# Patient Record
Sex: FEMALE | Race: OTHER | HISPANIC OR LATINO | Employment: FULL TIME | URBAN - METROPOLITAN AREA
[De-identification: names, ages, dates, MRNs, and addresses within clinical notes are randomized per-mention and may not be internally consistent; named-entity substitution may affect disease eponyms.]

---

## 2021-11-19 ENCOUNTER — IMMUNIZATION (OUTPATIENT)
Dept: INTERNAL MEDICINE | Facility: CLINIC | Age: 26
End: 2021-11-19

## 2021-11-19 PROCEDURE — 90471 IMMUNIZATION ADMIN: CPT | Mod: PBBFAC

## 2021-12-23 ENCOUNTER — LAB VISIT (OUTPATIENT)
Dept: PRIMARY CARE CLINIC | Facility: OTHER | Age: 26
End: 2021-12-23

## 2021-12-23 DIAGNOSIS — J02.9 SORE THROAT: Primary | ICD-10-CM

## 2021-12-23 LAB
CTP QC/QA: YES
SARS-COV-2 AG RESP QL IA.RAPID: NEGATIVE

## 2022-02-21 DIAGNOSIS — J02.9 SORE THROAT: Primary | ICD-10-CM

## 2022-02-21 LAB
CTP QC/QA: YES
SARS-COV-2 AG RESP QL IA.RAPID: NEGATIVE

## 2022-02-21 PROCEDURE — 87811 SARS CORONAVIRUS 2 ANTIGEN POCT, MANUAL READ: ICD-10-PCS | Mod: S$GLB,,, | Performed by: PREVENTIVE MEDICINE

## 2022-02-21 PROCEDURE — 87811 SARS-COV-2 COVID19 W/OPTIC: CPT | Mod: S$GLB,,, | Performed by: PREVENTIVE MEDICINE

## 2022-03-31 ENCOUNTER — OFFICE VISIT (OUTPATIENT)
Dept: INTERNAL MEDICINE | Facility: CLINIC | Age: 27
End: 2022-03-31

## 2022-03-31 VITALS
BODY MASS INDEX: 46.09 KG/M2 | DIASTOLIC BLOOD PRESSURE: 80 MMHG | WEIGHT: 250.44 LBS | OXYGEN SATURATION: 97 % | SYSTOLIC BLOOD PRESSURE: 132 MMHG | HEIGHT: 62 IN | HEART RATE: 83 BPM

## 2022-03-31 DIAGNOSIS — M25.572 ACUTE LEFT ANKLE PAIN: Primary | ICD-10-CM

## 2022-03-31 PROCEDURE — 99999 PR PBB SHADOW E&M-EST. PATIENT-LVL III: CPT | Mod: PBBFAC,,, | Performed by: INTERNAL MEDICINE

## 2022-03-31 PROCEDURE — 99202 PR OFFICE/OUTPT VISIT, NEW, LEVL II, 15-29 MIN: ICD-10-PCS | Mod: S$PBB,,, | Performed by: INTERNAL MEDICINE

## 2022-03-31 PROCEDURE — 99202 OFFICE O/P NEW SF 15 MIN: CPT | Mod: S$PBB,,, | Performed by: INTERNAL MEDICINE

## 2022-03-31 PROCEDURE — 99213 OFFICE O/P EST LOW 20 MIN: CPT | Mod: PBBFAC | Performed by: INTERNAL MEDICINE

## 2022-03-31 PROCEDURE — 99999 PR PBB SHADOW E&M-EST. PATIENT-LVL III: ICD-10-PCS | Mod: PBBFAC,,, | Performed by: INTERNAL MEDICINE

## 2022-03-31 RX ORDER — ESCITALOPRAM OXALATE 10 MG/1
TABLET ORAL
COMMUNITY
End: 2024-03-01

## 2022-03-31 NOTE — PROGRESS NOTES
"  Subjective:       Patient ID: Marlin Jamison is a 26 y.o. female.    Chief Complaint: Leg Injury      HPI  Marlin Jamison is a 26 y.o. year old female ankle inversion injury while wearing 1 inch heels. No LOC, no head injury, mechanical slip and fall. Has been recurrently injuring L ankle over the years. Always L inversion injury.     Review of Systems   Constitutional:        No head injury  Mechanical slip and fall  No LOC   Musculoskeletal: Positive for arthralgias.         History reviewed. No pertinent past medical history.     Prior to Admission medications    Medication Sig Start Date End Date Taking? Authorizing Provider   EScitalopram oxalate (LEXAPRO) 10 MG tablet    Yes Historical Provider        Past medical history, surgical history, and family medical history reviewed and updated as appropriate.    Medications and allergies reviewed.     Objective:          Vitals:    03/31/22 1444   BP: 132/80   BP Location: Right arm   Patient Position: Sitting   BP Method: Medium (Manual)   Pulse: 83   SpO2: 97%   Weight: 113.6 kg (250 lb 7.1 oz)   Height: 5' 2" (1.575 m)     Body mass index is 45.81 kg/m².  Physical Exam  Cardiovascular:      Pulses:           Dorsalis pedis pulses are 2+ on the right side and 2+ on the left side.   Musculoskeletal:      Right foot: Normal range of motion. No deformity, foot drop or prominent metatarsal heads.      Left foot: Decreased range of motion. No deformity, foot drop or prominent metatarsal heads.        Feet:    Feet:      Right foot:      Skin integrity: Skin integrity normal. No erythema or warmth.      Toenail Condition: Right toenails are normal.      Left foot:      Skin integrity: Skin integrity normal. No erythema or warmth.      Toenail Condition: Left toenails are normal.         No results found for: WBC, HGB, HCT, PLT, CHOL, TRIG, HDL, LDLDIRECT, ALT, AST, NA, K, CL, CREATININE, BUN, CO2, TSH, PSA, INR, GLUF, HGBA1C, MICROALBUR    Assessment:       1. Acute " left ankle pain          Plan:     Marlin was seen today for leg injury.    Diagnoses and all orders for this visit:    Acute left ankle pain  Comments:  recurrent sprain of L ankle, inversion injury, mild bruising inferior to lateral malleolus, difficulty with eversion. Taking ibuprofen with relief. ice helps  Orders:  -     X-Ray Ankle Complete 3 View Left; Future    ace wrap applied to ankle in clinic; discussed RICE therapy    Obtain x-ray today, if abnormal, orthopedics. If normal, will allow time for recovery, if having further issues - pt and possibly ortho/sports medicine referral.  Follow up if symptoms worsen or fail to improve.    Prateek Bender MD  Internal Medicine / Primary Care  [facility]  3/31/2022

## 2022-06-23 DIAGNOSIS — R43.0 LOSS OF SMELL: Primary | ICD-10-CM

## 2022-06-23 LAB
CTP QC/QA: YES
SARS-COV-2 AG RESP QL IA.RAPID: POSITIVE

## 2022-06-28 ENCOUNTER — TELEPHONE (OUTPATIENT)
Dept: INTERNAL MEDICINE | Facility: CLINIC | Age: 27
End: 2022-06-28

## 2022-07-17 ENCOUNTER — HOSPITAL ENCOUNTER (EMERGENCY)
Facility: OTHER | Age: 27
Discharge: HOME | End: 2022-07-17
Attending: EMERGENCY MEDICINE

## 2022-07-17 VITALS
RESPIRATION RATE: 16 BRPM | HEIGHT: 62 IN | HEART RATE: 82 BPM | BODY MASS INDEX: 44.16 KG/M2 | SYSTOLIC BLOOD PRESSURE: 136 MMHG | OXYGEN SATURATION: 98 % | DIASTOLIC BLOOD PRESSURE: 72 MMHG | WEIGHT: 240 LBS | TEMPERATURE: 98 F

## 2022-07-17 DIAGNOSIS — S41.119A LACERATION OF ARM: Primary | ICD-10-CM
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
## 2022-07-17 DIAGNOSIS — S00.83XA CONTUSION OF FACE, INITIAL ENCOUNTER: ICD-10-CM

## 2022-07-17 PROCEDURE — 90715 TDAP VACCINE 7 YRS/> IM: CPT | Performed by: NURSE PRACTITIONER

## 2022-07-17 PROCEDURE — 90471 IMMUNIZATION ADMIN: CPT | Performed by: NURSE PRACTITIONER

## 2022-07-17 PROCEDURE — 12001 RPR S/N/AX/GEN/TRNK 2.5CM/<: CPT

## 2022-07-17 PROCEDURE — 99284 EMERGENCY DEPT VISIT MOD MDM: CPT | Mod: 25

## 2022-07-17 PROCEDURE — 63600175 PHARM REV CODE 636 W HCPCS: Performed by: NURSE PRACTITIONER

## 2022-07-17 RX ADMIN — CLOSTRIDIUM TETANI TOXOID ANTIGEN (FORMALDEHYDE INACTIVATED), CORYNEBACTERIUM DIPHTHERIAE TOXOID ANTIGEN (FORMALDEHYDE INACTIVATED), BORDETELLA PERTUSSIS TOXOID ANTIGEN (GLUTARALDEHYDE INACTIVATED), BORDETELLA PERTUSSIS FILAMENTOUS HEMAGGLUTININ ANTIGEN (FORMALDEHYDE INACTIVATED), BORDETELLA PERTUSSIS PERTACTIN ANTIGEN, AND BORDETELLA PERTUSSIS FIMBRIAE 2/3 ANTIGEN 0.5 ML: 5; 2; 2.5; 5; 3; 5 INJECTION, SUSPENSION INTRAMUSCULAR at 07:07
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
## 2022-07-28 ENCOUNTER — CLINICAL SUPPORT (OUTPATIENT)
Dept: URGENT CARE | Facility: CLINIC | Age: 27
End: 2022-07-28

## 2022-07-28 VITALS
SYSTOLIC BLOOD PRESSURE: 140 MMHG | OXYGEN SATURATION: 98 % | DIASTOLIC BLOOD PRESSURE: 101 MMHG | BODY MASS INDEX: 44.16 KG/M2 | HEART RATE: 77 BPM | WEIGHT: 240 LBS | TEMPERATURE: 99 F | HEIGHT: 62 IN | RESPIRATION RATE: 18 BRPM

## 2022-07-28 DIAGNOSIS — Z48.02 ENCOUNTER FOR REMOVAL OF SUTURES: Primary | ICD-10-CM

## 2022-07-28 PROCEDURE — 99499 NO LOS: ICD-10-PCS | Mod: S$GLB,,,

## 2022-07-28 PROCEDURE — 99499 UNLISTED E&M SERVICE: CPT | Mod: S$GLB,,,

## 2022-07-28 PROCEDURE — 99024 POSTOP FOLLOW-UP VISIT: CPT | Mod: S$GLB,,,

## 2022-07-28 PROCEDURE — 99024 SUTURE REMOVAL: ICD-10-PCS | Mod: S$GLB,,,

## 2022-07-28 NOTE — PROCEDURES
Suture Removal    Date/Time: 7/28/2022 5:15 PM  Location procedure was performed: [facility]  Performed by: Unique Leavitt PA-C  Authorized by: Unique Leavitt PA-C   Body area: upper extremity  Location details: right upper arm  Wound Appearance: clean, well healed, no drainage and nontender  Sutures Removed: 2  Post-removal: no dressing applied  Facility: sutures placed in this facility  Complications: No

## 2022-07-28 NOTE — PROGRESS NOTES
"Subjective:       Patient ID: Marlin Jamison is a 26 y.o. female.    Vitals:  height is 5' 2" (1.575 m) and weight is 108.9 kg (240 lb). Her oral temperature is 98.7 °F (37.1 °C). Her blood pressure is 140/101 (abnormal) and her pulse is 77. Her respiration is 18 and oxygen saturation is 98%.     Chief Complaint: Suture / Staple Removal    PT IS HERE TODAY TO HAVE SUTURES REMOVED FROM RIGHT ELBOW.  [facility]    Suture / Staple Removal  The sutures were placed 11 to 14 days ago. She tried nothing since the wound repair. The temperature was taken using an oral thermometer. There has been no drainage from the wound. There is no redness present. There is no swelling present. There is no pain present.     ROS    Objective:      Physical Exam   Constitutional: She is oriented to person, place, and time. She appears well-developed. She is cooperative.  Non-toxic appearance. She does not appear ill. No distress.   HENT:   Head: Normocephalic and atraumatic.   Ears:   Right Ear: Hearing and external ear normal.   Left Ear: Hearing and external ear normal.   Eyes: Lids are normal. Right eye exhibits no discharge. Left eye exhibits no discharge. No scleral icterus.   Neck: Trachea normal and phonation normal. Neck supple.   Cardiovascular: Normal rate and normal pulses.   Pulmonary/Chest: Effort normal. No respiratory distress.   Abdominal: Normal appearance.   Musculoskeletal: Normal range of motion.         General: No deformity. Normal range of motion.   Neurological: She is alert and oriented to person, place, and time. She exhibits normal muscle tone. Coordination normal.   Skin: Skin is warm, dry, intact, not diaphoretic and not pale.         Comments: Inverted y shaped laceration to posterior right upper arm just superior to olecranon process  No wound dehiscence  No signs of infection   Psychiatric: Her speech is normal and behavior is normal. Judgment and thought content normal. "   Nursing note and vitals reviewed.        Assessment:       1. Encounter for removal of sutures          Plan:         Wound C/D/I. No signs of infection. 2 sutures removed without difficulty.     Encounter for removal of sutures

## 2023-01-03 ENCOUNTER — PATIENT MESSAGE (OUTPATIENT)
Dept: RESEARCH | Facility: HOSPITAL | Age: 28
End: 2023-01-03

## 2023-04-13 ENCOUNTER — PATIENT MESSAGE (OUTPATIENT)
Dept: INTERNAL MEDICINE | Facility: CLINIC | Age: 28
End: 2023-04-13

## 2023-04-19 ENCOUNTER — PATIENT MESSAGE (OUTPATIENT)
Dept: RESEARCH | Facility: HOSPITAL | Age: 28
End: 2023-04-19

## 2023-05-12 ENCOUNTER — PATIENT MESSAGE (OUTPATIENT)
Dept: RESEARCH | Facility: HOSPITAL | Age: 28
End: 2023-05-12

## 2024-02-27 ENCOUNTER — NURSE TRIAGE (OUTPATIENT)
Dept: ADMINISTRATIVE | Facility: CLINIC | Age: 29
End: 2024-02-27
Payer: COMMERCIAL

## 2024-02-27 NOTE — TELEPHONE ENCOUNTER
Spoke with patient who states she has been having a irregular periods that comes with debilitating cramps intermittently.  Patient states the pain then radiates to her right side.  She currently has an IUD in place that has not been checked in 2 years.   Patient states she vomits daily related to anxiety.  Patient denies having anxiety at this time.  States she felt this way earlier today.  Patient vomited x 1 today (reoccurring 3-4 months).   Patient states she sometimes has severe abdominal pain but not at this time.  Patient states she is not able to tell if she has much more swelling in her abdomen. Patient does report having moderate vaginal bleeding.   Advised patient to be seen in office now.  Patient does not have established care with a OB-Gyn.  New patient appointment is on 3/12 patient stated she would wait until then.  Further advised that patient be seen today at [facility] or ED.  Patient verbalized understanding.     Reason for Disposition   MODERATE vaginal bleeding (e.g., soaking 1 pad or tampon per hour and present > 6 hours; 1 menstrual cup every 6 hours)   Vomiting and abdomen looks much more swollen than usual     Patient is not able to tell if her abdomen is more swollen than usual.   Vomiting and abdomen looks much more swollen than usual     Patient is not able to tell if her abdomen is more swollen than usual.    Additional Information   Negative: Shock suspected (e.g., cold/pale/clammy skin, too weak to stand, low BP, rapid pulse)   Negative: Sounds like a life-threatening emergency to the triager   Negative: SEVERE abdominal pain (e.g., excruciating) and present > 1 hour   Negative: SEVERE vaginal bleeding (e.g., soaking 2 pads or tampons per hour and present 2 or more hours; 1 menstrual cup every 2 hours)   Negative: SEVERE dizziness (e.g., unable to stand, requires support to walk, feels like passing out now)   Negative: Patient sounds very sick or weak to the triager   Negative: Passed out  (i.e., fainted, collapsed and was not responding)   Negative: Shock suspected (e.g., cold/pale/clammy skin, too weak to stand, low BP, rapid pulse)   Negative: Sounds like a life-threatening emergency to the triager   Negative: SEVERE abdominal pain (e.g., excruciating)   Negative: Vomiting red blood or black (coffee ground) material   Negative: Blood in bowel movements  (Exception: Blood on surface of BM with constipation.)   Negative: Black or tarry bowel movements  (Exception: Chronic-unchanged black-grey BMs AND is taking iron pills or Pepto-Bismol.)   Negative: MILD TO MODERATE constant pain lasting > 2 hours   Negative: Vomiting bile (green color)   Negative: Patient sounds very sick or weak to the triager   Negative: Shock suspected (e.g., cold/pale/clammy skin, too weak to stand, low BP, rapid pulse)   Negative: Difficult to awaken or acting confused (e.g., disoriented, slurred speech)   Negative: Sounds like a life-threatening emergency to the triager   Negative: Vomiting red blood or black (coffee ground) material   Negative: SEVERE vomiting (e.g., 6 or more times/day)  (Exception: Patient sounds well, is drinking liquids, does not sound dehydrated, and vomiting has lasted less than 24 hours.)   Negative: Recent head injury (within 3 days)   Negative: Recent abdominal injury (within 7 days)   Negative: Vomiting and hernia is more painful or swollen than usual   Negative: Insulin-dependent diabetes and glucose > 240 mg/dL (13 mmol/L)   Negative: Severe pain in one eye   Negative: MODERATE vomiting (e.g., 3 - 5 times/day) and age > 60 years   Negative: Constant abdominal pain lasting > 2 hours   Negative: High-risk adult (e.g., brain tumor, V-P shunt, hernia)   Negative: Drinking very little and has signs of dehydration (e.g., no urine > 12 hours, very dry mouth, very lightheaded)   Negative: Patient sounds very sick or weak to the triager    Protocols used: Contraception - IUD Symptoms and [id number],  Abdominal Pain - Female-A-OH, Vomiting-A-OH

## 2024-02-28 ENCOUNTER — TELEPHONE (OUTPATIENT)
Dept: OBSTETRICS AND GYNECOLOGY | Facility: CLINIC | Age: 29
End: 2024-02-28
Payer: COMMERCIAL

## 2024-03-01 ENCOUNTER — OFFICE VISIT (OUTPATIENT)
Dept: INTERNAL MEDICINE | Facility: CLINIC | Age: 29
End: 2024-03-01
Payer: MEDICAID

## 2024-03-01 VITALS
SYSTOLIC BLOOD PRESSURE: 130 MMHG | HEIGHT: 62 IN | WEIGHT: 245.38 LBS | OXYGEN SATURATION: 96 % | DIASTOLIC BLOOD PRESSURE: 86 MMHG | BODY MASS INDEX: 45.15 KG/M2 | HEART RATE: 93 BPM

## 2024-03-01 DIAGNOSIS — G89.29 CHRONIC PAIN OF LEFT KNEE: ICD-10-CM

## 2024-03-01 DIAGNOSIS — M25.562 CHRONIC PAIN OF LEFT KNEE: ICD-10-CM

## 2024-03-01 DIAGNOSIS — Z00.00 ANNUAL PHYSICAL EXAM: Primary | ICD-10-CM

## 2024-03-01 DIAGNOSIS — L30.9 ECZEMA, UNSPECIFIED TYPE: ICD-10-CM

## 2024-03-01 PROCEDURE — 99999 PR PBB SHADOW E&M-EST. PATIENT-LVL IV: CPT | Mod: PBBFAC,,, | Performed by: PHYSICIAN ASSISTANT

## 2024-03-01 PROCEDURE — 99395 PREV VISIT EST AGE 18-39: CPT | Mod: S$PBB,,, | Performed by: PHYSICIAN ASSISTANT

## 2024-03-01 PROCEDURE — 99214 OFFICE O/P EST MOD 30 MIN: CPT | Mod: PBBFAC | Performed by: PHYSICIAN ASSISTANT

## 2024-03-01 RX ORDER — FLUOXETINE 10 MG/1
10 CAPSULE ORAL
COMMUNITY
Start: 2023-12-23 | End: 2024-03-12

## 2024-03-01 RX ORDER — LAMOTRIGINE 200 MG/1
TABLET, ORALLY DISINTEGRATING ORAL
COMMUNITY
Start: 2023-12-01

## 2024-03-01 RX ORDER — CLONAZEPAM 0.5 MG/1
TABLET, ORALLY DISINTEGRATING ORAL
COMMUNITY
Start: 2023-11-06

## 2024-03-01 RX ORDER — TRIAMCINOLONE ACETONIDE 1 MG/G
CREAM TOPICAL 2 TIMES DAILY
Qty: 80 G | Refills: 0 | Status: SHIPPED | OUTPATIENT
Start: 2024-03-01 | End: 2024-06-11 | Stop reason: SDUPTHER

## 2024-03-01 RX ORDER — TRIAMCINOLONE ACETONIDE 1 MG/G
CREAM TOPICAL
COMMUNITY
End: 2024-03-01 | Stop reason: SDUPTHER

## 2024-03-01 RX ORDER — METHYLPHENIDATE HYDROCHLORIDE 20 MG/1
20 CAPSULE, EXTENDED RELEASE ORAL
COMMUNITY
Start: 2024-02-16

## 2024-03-01 NOTE — PROGRESS NOTES
Subjective     Patient ID: Marlin Jamison is a 28 y.o. female.    Chief Complaint: Annual Exam    HPI  Established pt of No, Primary Doctor (new to me)    Here for annual exam.     Noted some pelvic concerns has IUD placed that's overdue for removal, having cramps irregular menstrual cycles, heavy bleeding. Has f/u with GYN schedule for 3/12    Follows with psychiatry at [facility] for anxiety/ptsd/bipolar, mood improving in current regimen. Seeing a therapist    Request refill of steroid cream for eczema, uses prn    Past Medical History:   Diagnosis Date    Anxiety     Bipolar disorder, unspecified     Depression     PTSD (post-traumatic stress disorder)      Social History     Tobacco Use    Smoking status: Every Day     Current packs/day: 0.50     Average packs/day: 0.5 packs/day for 10.0 years (5.0 ttl pk-yrs)     Types: Cigarettes    Smokeless tobacco: Never   Substance Use Topics    Alcohol use: Yes     Alcohol/week: 8.0 standard drinks of alcohol     Types: 4 Glasses of wine, 4 Shots of liquor per week     Comment: social    Drug use: Yes     Types: Marijuana     Comment: social     Review of patient's allergies indicates:  No Known Allergies      Current Outpatient Medications:     clonazePAM (KLONOPIN) 0.5 MG disintegrating tablet, , Disp: , Rfl:     FLUoxetine 10 MG capsule, Take 10 mg by mouth., Disp: , Rfl:     lamoTRIgine 200 mg TbDL, , Disp: , Rfl:     methylphenidate HCl (METADATE CD) 20 MG CR capsule, Take 20 mg by mouth., Disp: , Rfl:     triamcinolone acetonide 0.1% (KENALOG) 0.1 % cream, Apply topically 2 (two) times daily., Disp: 80 g, Rfl: 0    Family History   Problem Relation Age of Onset    Diabetes Mother     Depression Mother     Kidney disease Mother     Mental illness Mother     No Known Problems Father      Past Surgical History:   Procedure Laterality Date    APPENDECTOMY      TONSILLECTOMY  2002         Review of Systems   Constitutional:  Positive for  "unexpected weight change. Negative for activity change.   HENT:  Negative for hearing loss, rhinorrhea and trouble swallowing.    Eyes:  Negative for discharge and visual disturbance.   Respiratory:  Negative for chest tightness and wheezing.    Cardiovascular:  Negative for chest pain and palpitations.   Gastrointestinal:  Positive for constipation and diarrhea. Negative for blood in stool and vomiting.   Endocrine: Negative for polydipsia and polyuria.   Genitourinary:  Positive for menstrual problem. Negative for difficulty urinating, dysuria and hematuria.   Musculoskeletal:  Positive for arthralgias. Negative for joint swelling and neck pain.   Neurological:  Negative for weakness and headaches.   Psychiatric/Behavioral:  Negative for confusion and dysphoric mood.      Answers submitted by the patient for this visit:  Review of Systems Questionnaire (Submitted on 2/27/2024)  activity change: No  unexpected weight change: Yes (weight gain, SSRI med related, now stable)  neck pain: No  hearing loss: No  rhinorrhea: No  trouble swallowing: No  eye discharge: No  visual disturbance: No  chest tightness: No  wheezing: No  chest pain: No  palpitations: No  blood in stool: No  constipation: Yes (in the past, last BM normal)  vomiting: No  diarrhea: Yes (in the past, last BM normal)  polydipsia: No  polyuria: No  difficulty urinating: No  hematuria: No  menstrual problem: Yes  dysuria: No  joint swelling: No  arthralgias: Yes (L knee, chronic, after falls)  headaches: No  weakness: No  confusion: No  dysphoric mood: No     Objective  /86 (BP Location: Right arm, Patient Position: Sitting, BP Method: Large (Manual))   Pulse 93   Ht 5' 2" (1.575 m)   Wt 111.3 kg (245 lb 6 oz)   SpO2 96%   BMI 44.88 kg/m²       Physical Exam  Vitals reviewed.   Constitutional:       General: She is not in acute distress.     Appearance: She is well-developed.   HENT:      Head: Normocephalic and atraumatic.      Right Ear: " Tympanic membrane, ear canal and external ear normal.      Left Ear: Tympanic membrane, ear canal and external ear normal.   Eyes:      Pupils: Pupils are equal, round, and reactive to light.   Cardiovascular:      Rate and Rhythm: Normal rate and regular rhythm.      Heart sounds: No murmur heard.  Pulmonary:      Effort: Pulmonary effort is normal.      Breath sounds: Normal breath sounds. No wheezing or rales.   Abdominal:      General: Bowel sounds are normal.      Palpations: Abdomen is soft.      Tenderness: There is no abdominal tenderness.   Musculoskeletal:         General: Normal range of motion.      Left knee: No swelling or bony tenderness. Normal range of motion. No tenderness.      Right lower leg: No edema.      Left lower leg: No edema.   Skin:     General: Skin is warm and dry.      Findings: No rash.   Neurological:      Mental Status: She is alert.   Psychiatric:         Mood and Affect: Mood normal.            Assessment and Plan     1. Annual physical exam  -     CBC Auto Differential; Future; Expected date: 03/01/2024  -     Comprehensive Metabolic Panel; Future; Expected date: 03/01/2024  -     TSH; Future; Expected date: 03/01/2024  -     Lipid Panel; Future; Expected date: 03/01/2024  -     Hemoglobin A1C; Future; Expected date: 03/01/2024  -     Hepatitis C Antibody; Future; Expected date: 03/01/2024  -     HIV 1/2 Ag/Ab (4th Gen); Future; Expected date: 03/01/2024    2. Eczema, unspecified type  -     triamcinolone acetonide 0.1% (KENALOG) 0.1 % cream; Apply topically 2 (two) times daily.  Dispense: 80 g; Refill: 0    3. Chronic pain of left knee  -     X-Ray Knee 3 View Left; Future; Expected date: 03/01/2024      Discussed need to choose MD as PCP to fully establish care with our practice site (list provided)      Future Appointments   Date Time Provider Department [place]   3/4/2024 11:00 AM [facility]   3/12/2024  1:15 PM Patricia Mancini MD [facility] [place]    3/28/2024  2:00 PM Nilda Doyle PA-C [facility]       Stephanie Ortiz PA-C

## 2024-03-04 ENCOUNTER — LAB VISIT (OUTPATIENT)
Dept: LAB | Facility: HOSPITAL | Age: 29
End: 2024-03-04
Attending: PHYSICIAN ASSISTANT
Payer: MEDICAID

## 2024-03-04 DIAGNOSIS — Z00.00 ANNUAL PHYSICAL EXAM: ICD-10-CM

## 2024-03-04 LAB
ALBUMIN SERPL BCP-MCNC: 3.7 G/DL (ref 3.5–5.2)
ALP SERPL-CCNC: 45 U/L (ref 55–135)
ALT SERPL W/O P-5'-P-CCNC: 28 U/L (ref 10–44)
ANION GAP SERPL CALC-SCNC: 12 MMOL/L (ref 8–16)
AST SERPL-CCNC: 27 U/L (ref 10–40)
BASOPHILS # BLD AUTO: 0.07 K/UL (ref 0–0.2)
BASOPHILS NFR BLD: 1 % (ref 0–1.9)
BILIRUB SERPL-MCNC: 0.3 MG/DL (ref 0.1–1)
BUN SERPL-MCNC: 12 MG/DL (ref 6–20)
CALCIUM SERPL-MCNC: 9.2 MG/DL (ref 8.7–10.5)
CHLORIDE SERPL-SCNC: 105 MMOL/L (ref 95–110)
CHOLEST SERPL-MCNC: 162 MG/DL (ref 120–199)
CHOLEST/HDLC SERPL: 3.1 {RATIO} (ref 2–5)
CO2 SERPL-SCNC: 19 MMOL/L (ref 23–29)
CREAT SERPL-MCNC: 0.7 MG/DL (ref 0.5–1.4)
DIFFERENTIAL METHOD BLD: ABNORMAL
EOSINOPHIL # BLD AUTO: 0.1 K/UL (ref 0–0.5)
EOSINOPHIL NFR BLD: 1.7 % (ref 0–8)
ERYTHROCYTE [DISTWIDTH] IN BLOOD BY AUTOMATED COUNT: 13.8 % (ref 11.5–14.5)
EST. GFR  (NO RACE VARIABLE): >60 ML/MIN/1.73 M^2
ESTIMATED AVG GLUCOSE: 100 MG/DL (ref 68–131)
GLUCOSE SERPL-MCNC: 82 MG/DL (ref 70–110)
HBA1C MFR BLD: 5.1 % (ref 4–5.6)
HCT VFR BLD AUTO: 43.6 % (ref 37–48.5)
HCV AB SERPL QL IA: NORMAL
HDLC SERPL-MCNC: 52 MG/DL (ref 40–75)
HDLC SERPL: 32.1 % (ref 20–50)
HGB BLD-MCNC: 14.1 G/DL (ref 12–16)
HIV 1+2 AB+HIV1 P24 AG SERPL QL IA: NORMAL
IMM GRANULOCYTES # BLD AUTO: 0.04 K/UL (ref 0–0.04)
IMM GRANULOCYTES NFR BLD AUTO: 0.6 % (ref 0–0.5)
LDLC SERPL CALC-MCNC: 90.8 MG/DL (ref 63–159)
LYMPHOCYTES # BLD AUTO: 1.7 K/UL (ref 1–4.8)
LYMPHOCYTES NFR BLD: 22.8 % (ref 18–48)
MCH RBC QN AUTO: 30.5 PG (ref 27–31)
MCHC RBC AUTO-ENTMCNC: 32.3 G/DL (ref 32–36)
MCV RBC AUTO: 94 FL (ref 82–98)
MONOCYTES # BLD AUTO: 0.6 K/UL (ref 0.3–1)
MONOCYTES NFR BLD: 8.8 % (ref 4–15)
NEUTROPHILS # BLD AUTO: 4.7 K/UL (ref 1.8–7.7)
NEUTROPHILS NFR BLD: 65.1 % (ref 38–73)
NONHDLC SERPL-MCNC: 110 MG/DL
NRBC BLD-RTO: 0 /100 WBC
PLATELET # BLD AUTO: 302 K/UL (ref 150–450)
PMV BLD AUTO: 10.4 FL (ref 9.2–12.9)
POTASSIUM SERPL-SCNC: 4.1 MMOL/L (ref 3.5–5.1)
PROT SERPL-MCNC: 6.9 G/DL (ref 6–8.4)
RBC # BLD AUTO: 4.63 M/UL (ref 4–5.4)
SODIUM SERPL-SCNC: 136 MMOL/L (ref 136–145)
TRIGL SERPL-MCNC: 96 MG/DL (ref 30–150)
TSH SERPL DL<=0.005 MIU/L-ACNC: 1.76 UIU/ML (ref 0.4–4)
WBC # BLD AUTO: 7.25 K/UL (ref 3.9–12.7)

## 2024-03-04 PROCEDURE — 86803 HEPATITIS C AB TEST: CPT | Performed by: PHYSICIAN ASSISTANT

## 2024-03-04 PROCEDURE — 80053 COMPREHEN METABOLIC PANEL: CPT | Performed by: PHYSICIAN ASSISTANT

## 2024-03-04 PROCEDURE — 83036 HEMOGLOBIN GLYCOSYLATED A1C: CPT | Performed by: PHYSICIAN ASSISTANT

## 2024-03-04 PROCEDURE — 85025 COMPLETE CBC W/AUTO DIFF WBC: CPT | Performed by: PHYSICIAN ASSISTANT

## 2024-03-04 PROCEDURE — 87389 HIV-1 AG W/HIV-1&-2 AB AG IA: CPT | Performed by: PHYSICIAN ASSISTANT

## 2024-03-04 PROCEDURE — 36415 COLL VENOUS BLD VENIPUNCTURE: CPT | Mod: PO | Performed by: PHYSICIAN ASSISTANT

## 2024-03-04 PROCEDURE — 84443 ASSAY THYROID STIM HORMONE: CPT | Performed by: PHYSICIAN ASSISTANT

## 2024-03-04 PROCEDURE — 80061 LIPID PANEL: CPT | Performed by: PHYSICIAN ASSISTANT

## 2024-03-12 ENCOUNTER — LAB VISIT (OUTPATIENT)
Dept: LAB | Facility: HOSPITAL | Age: 29
End: 2024-03-12
Attending: STUDENT IN AN ORGANIZED HEALTH CARE EDUCATION/TRAINING PROGRAM
Payer: MEDICAID

## 2024-03-12 ENCOUNTER — OFFICE VISIT (OUTPATIENT)
Dept: OBSTETRICS AND GYNECOLOGY | Facility: CLINIC | Age: 29
End: 2024-03-12
Payer: MEDICAID

## 2024-03-12 VITALS
SYSTOLIC BLOOD PRESSURE: 126 MMHG | HEART RATE: 80 BPM | BODY MASS INDEX: 41.44 KG/M2 | WEIGHT: 242.75 LBS | HEIGHT: 64 IN | DIASTOLIC BLOOD PRESSURE: 90 MMHG

## 2024-03-12 DIAGNOSIS — Z12.4 ENCOUNTER FOR PAPANICOLAOU SMEAR FOR CERVICAL CANCER SCREENING: ICD-10-CM

## 2024-03-12 DIAGNOSIS — Z01.419 ENCOUNTER FOR GYNECOLOGICAL EXAMINATION: Primary | ICD-10-CM

## 2024-03-12 DIAGNOSIS — Z11.3 SCREENING EXAMINATION FOR STD (SEXUALLY TRANSMITTED DISEASE): ICD-10-CM

## 2024-03-12 DIAGNOSIS — R10.31 RLQ ABDOMINAL PAIN: ICD-10-CM

## 2024-03-12 DIAGNOSIS — Z11.51 ENCOUNTER FOR SCREENING FOR HUMAN PAPILLOMAVIRUS (HPV): ICD-10-CM

## 2024-03-12 LAB — HBV SURFACE AG SERPL QL IA: NORMAL

## 2024-03-12 PROCEDURE — 99385 PREV VISIT NEW AGE 18-39: CPT | Mod: S$PBB,,, | Performed by: STUDENT IN AN ORGANIZED HEALTH CARE EDUCATION/TRAINING PROGRAM

## 2024-03-12 PROCEDURE — 87340 HEPATITIS B SURFACE AG IA: CPT | Performed by: STUDENT IN AN ORGANIZED HEALTH CARE EDUCATION/TRAINING PROGRAM

## 2024-03-12 PROCEDURE — 36415 COLL VENOUS BLD VENIPUNCTURE: CPT | Performed by: STUDENT IN AN ORGANIZED HEALTH CARE EDUCATION/TRAINING PROGRAM

## 2024-03-12 PROCEDURE — 86592 SYPHILIS TEST NON-TREP QUAL: CPT | Performed by: STUDENT IN AN ORGANIZED HEALTH CARE EDUCATION/TRAINING PROGRAM

## 2024-03-12 PROCEDURE — 87591 N.GONORRHOEAE DNA AMP PROB: CPT | Performed by: STUDENT IN AN ORGANIZED HEALTH CARE EDUCATION/TRAINING PROGRAM

## 2024-03-12 PROCEDURE — 87624 HPV HI-RISK TYP POOLED RSLT: CPT | Performed by: STUDENT IN AN ORGANIZED HEALTH CARE EDUCATION/TRAINING PROGRAM

## 2024-03-12 PROCEDURE — 99213 OFFICE O/P EST LOW 20 MIN: CPT | Mod: PBBFAC | Performed by: STUDENT IN AN ORGANIZED HEALTH CARE EDUCATION/TRAINING PROGRAM

## 2024-03-12 PROCEDURE — 87491 CHLMYD TRACH DNA AMP PROBE: CPT | Performed by: STUDENT IN AN ORGANIZED HEALTH CARE EDUCATION/TRAINING PROGRAM

## 2024-03-12 PROCEDURE — 88175 CYTOPATH C/V AUTO FLUID REDO: CPT | Performed by: STUDENT IN AN ORGANIZED HEALTH CARE EDUCATION/TRAINING PROGRAM

## 2024-03-12 PROCEDURE — 99999 PR PBB SHADOW E&M-EST. PATIENT-LVL III: CPT | Mod: PBBFAC,,, | Performed by: STUDENT IN AN ORGANIZED HEALTH CARE EDUCATION/TRAINING PROGRAM

## 2024-03-12 RX ORDER — FLUOXETINE HYDROCHLORIDE 20 MG/1
CAPSULE ORAL
COMMUNITY
Start: 2024-01-08

## 2024-03-13 LAB
C TRACH DNA SPEC QL NAA+PROBE: NOT DETECTED
N GONORRHOEA DNA SPEC QL NAA+PROBE: NOT DETECTED
RPR SER QL: NORMAL

## 2024-03-19 ENCOUNTER — TELEPHONE (OUTPATIENT)
Dept: OBSTETRICS AND GYNECOLOGY | Facility: CLINIC | Age: 29
End: 2024-03-19

## 2024-03-19 DIAGNOSIS — N94.6 DYSMENORRHEA: Primary | ICD-10-CM

## 2024-03-19 LAB
HPV HR 12 DNA SPEC QL NAA+PROBE: POSITIVE
HPV16 AG SPEC QL: NEGATIVE
HPV18 DNA SPEC QL NAA+PROBE: NEGATIVE

## 2024-03-19 RX ORDER — NAPROXEN 500 MG/1
500 TABLET ORAL 2 TIMES DAILY WITH MEALS
Qty: 30 TABLET | Refills: 1 | Status: SHIPPED | OUTPATIENT
Start: 2024-03-19

## 2024-03-19 NOTE — TELEPHONE ENCOUNTER
----- Message from Natalia Mauro MA sent at 3/19/2024 10:36 AM CDT -----    ----- Message -----  From: Fawn Carey  Sent: 3/19/2024  10:28 AM CDT  To: [facility] Staff    Type:  Patient Returning Call    Who Called:pt   Who Left Message for Patient:  Does the patient know what this is regarding?:rx  Would the patient rather a call back or a response via [facility]? call  Best Call Back Number:[phone]  Additional Information: states she has abdominal pain and bleeding states nothing was prescribed after being discussed with provider. Would like to speak with care team. States she would also like to speak with office in regards to moving up April 24th cortney for US+IUD removal.

## 2024-03-21 LAB
FINAL PATHOLOGIC DIAGNOSIS: NORMAL
Lab: NORMAL

## 2024-03-22 ENCOUNTER — PATIENT MESSAGE (OUTPATIENT)
Dept: OBSTETRICS AND GYNECOLOGY | Facility: CLINIC | Age: 29
End: 2024-03-22
Payer: MEDICAID

## 2024-03-27 NOTE — PROGRESS NOTES
Chief Complaint: Well Woman Exam, RLQ pain     HPI:      Marlin Jamison is a 28 y.o.  who presents today for well woman exam. C/o RLQ pain that has been coming and going for years, worse and more intense the past 6 months. Described as sharp cramping. Pain is worsened with BM, during cycle and with tactile pressure. Mild relief with NSAIDs, heating pad. Denies fever, chills, nausea, vomiting, changes in BM. Reports her cycles are irregular with the Kyleena IUD, which she believes is due for removal soon and desires removal.   LMP: Patient's last menstrual period was 02/10/2024 (approximate).  No other GYN issues, problems, or complaints. Specifically, patient denies abnormal vaginal discharge, current pelvic pain, urinary problems. Ms. Jamison is currently sexually active with a single male partner. She is currently using Kyleena inserted in 2019 for contraception. She would like STD screening today.    Previous Pap: WNL per pt    Past Medical History:   Diagnosis Date    Anxiety     Bipolar disorder, unspecified     Depression     PTSD (post-traumatic stress disorder)     STD (sexually transmitted disease)      Past Surgical History:   Procedure Laterality Date    ABDOMINAL SURGERY  2005    APPENDECTOMY      TONSILLECTOMY  2002     Social History     Socioeconomic History    Marital status: Single   Tobacco Use    Smoking status: Every Day     Current packs/day: 0.50     Average packs/day: 0.5 packs/day for 10.0 years (5.0 ttl pk-yrs)     Types: Cigarettes    Smokeless tobacco: Never   Substance and Sexual Activity    Alcohol use: Yes     Alcohol/week: 8.0 standard drinks of alcohol     Types: 4 Glasses of wine, 4 Shots of liquor per week     Comment: social    Drug use: Not Currently     Types: Marijuana     Comment: social    Sexual activity: Yes     Partners: Female, Male     Birth control/protection: I.U.D.     Social Determinants of Health     Financial Resource Strain: Medium Risk  "(2024)    Overall Financial Resource Strain (CARDIA)     Difficulty of Paying Living Expenses: Somewhat hard   Food Insecurity: Food Insecurity Present (2024)    Hunger Vital Sign     Worried About Running Out of Food in the Last Year: Never true     Ran Out of Food in the Last Year: Sometimes true   Transportation Needs: Unmet Transportation Needs (2024)    PRAPARE - Transportation     Lack of Transportation (Medical): Yes     Lack of Transportation (Non-Medical): Yes   Physical Activity: Insufficiently Active (2024)    Exercise Vital Sign     Days of Exercise per Week: 1 day     Minutes of Exercise per Session: 60 min   Stress: Stress Concern Present (2024)    [place] [employer] - Occupational Stress Questionnaire     Feeling of Stress : Very much   Social Connections: Unknown (2024)    Social Connection and Isolation Panel [NHANES]     Frequency of Communication with Friends and Family: More than three times a week     Frequency of Social Gatherings with Friends and Family: Twice a week     Active Member of Clubs or Organizations: No     Attends Club or Organization Meetings: Never     Marital Status: Living with partner   Housing Stability: High Risk (2024)    Housing Stability Vital Sign     Unable to Pay for Housing in the Last Year: Yes     Number of Places Lived in the Last Year: 1     Unstable Housing in the Last Year: No     Family History   Problem Relation Age of Onset    Diabetes Mother     Depression Mother     Kidney disease Mother     Mental illness Mother     No Known Problems Father      Review of patient's allergies indicates:  No Known Allergies    OB History          0    Para   0    Term   0       0    AB   0    Living   0         SAB   0    IAB   0    Ectopic   0    Multiple   0    Live Births   0                 Physical Exam:      PHYSICAL EXAM:  BP (!) 126/90   Pulse 80   Ht 5' 3.5" (1.613 m)   Wt 110.1 kg (242 lb " 11.6 oz)   LMP 02/10/2024 (Approximate)   BMI 42.32 kg/m²   Body mass index is 42.32 kg/m².     APPEARANCE: Well nourished, well developed, in no acute distress.  PSYCH: Appropriate mood and affect.  SKIN: No acne or hirsutism  NECK: Neck symmetric without masses  NODES: No inguinal, axillary, or supraclavicular lymph node enlargement  ABDOMEN: Soft.  No tenderness or masses.    CARDIOVASCULAR: No edema of peripheral extremities  BREASTS: Symmetrical, no visible skin lesions. No palpable masses. No nipple discharge bilaterally.  PELVIC: Normal external genitalia without lesions.  Normal hair distribution.  Adequate perineal body, normal urethral meatus.  Vagina moist and well rugated. Without lesions. withoutdischarge.  Cervix pink, without lesions, discharge or tenderness, IUD strings not visualized and unable to be retrieved with endocervical brush x 2.  No significant cystocele or rectocele.  Bimanual exam shows uterus to be normal size, regular, mobile and nontender.  Adnexa without masses or tenderness.      Assessment/Plan:     Encounter for gynecological examination    Encounter for Papanicolaou smear for cervical cancer screening  -     Liquid-Based Pap Smear, Screening    Encounter for screening for human papillomavirus (HPV)  -     HPV High Risk Genotypes, PCR    Screening examination for STD (sexually transmitted disease)  -     RPR; Future; Expected date: 03/12/2024  -     HEPATITIS B SURFACE ANTIGEN; Future; Expected date: 03/12/2024  -     C. trachomatis/N. gonorrhoeae by AMP DNA    RLQ abdominal pain  -     US Pelvis Comp with Transvag NON-OB (xpd; Future    - pelvic exam as above  - no IUD strings visualized, therefore, unable to removed today -- will set up for US guided IUD removal and obtain full pelvic US at that time to assess adnexa due to c/o pelvic pain, she is agreeable to this and advised to take ibuprofen prior to procedure  - gc/cz collected and serum STI panel ordered  - rtc for pelvic  US/IUD removal    Counseling:     Patient was counseled today on current ASCCP pap guidelines, the recommendation for yearly physical exams, safe driving habits, breast self awareness. She is to see her PCP for other health maintenance.     Use of the [facility] Patient Portal discussed and encouraged during today's visit.

## 2024-04-11 ENCOUNTER — PROCEDURE VISIT (OUTPATIENT)
Dept: OBSTETRICS AND GYNECOLOGY | Facility: CLINIC | Age: 29
End: 2024-04-11
Attending: STUDENT IN AN ORGANIZED HEALTH CARE EDUCATION/TRAINING PROGRAM
Payer: MEDICAID

## 2024-04-11 VITALS
DIASTOLIC BLOOD PRESSURE: 82 MMHG | WEIGHT: 246.81 LBS | SYSTOLIC BLOOD PRESSURE: 122 MMHG | BODY MASS INDEX: 43.03 KG/M2

## 2024-04-11 DIAGNOSIS — Z97.5 ATTEMPTED IUD REMOVAL, UNSUCCESSFUL: Primary | ICD-10-CM

## 2024-04-11 DIAGNOSIS — Z53.8 ATTEMPTED IUD REMOVAL, UNSUCCESSFUL: Primary | ICD-10-CM

## 2024-04-11 DIAGNOSIS — T83.32XD INTRAUTERINE CONTRACEPTIVE DEVICE THREADS LOST, SUBSEQUENT ENCOUNTER: ICD-10-CM

## 2024-04-11 PROCEDURE — 99499 UNLISTED E&M SERVICE: CPT | Mod: S$PBB,,, | Performed by: STUDENT IN AN ORGANIZED HEALTH CARE EDUCATION/TRAINING PROGRAM

## 2024-04-11 RX ORDER — DOXYCYCLINE HYCLATE 100 MG
100 TABLET ORAL 2 TIMES DAILY
Qty: 14 TABLET | Refills: 0 | Status: SHIPPED | OUTPATIENT
Start: 2024-04-11

## 2024-04-11 NOTE — PROCEDURES
Procedure: IUD Removal   Marlin Jamison is a 28 y.o.  who presents today for US guided IUD removal. IUD strings were not visible at prior visit.  Patient's last menstrual period was 2024 (approximate)..     The risks, benefits, and alternatives to IUD removal were reviewed.  All of Ms. Jamison's questions were answered. She voiced her understanding and agreed to proceed with removal. Consent was signed.     Vitals:    24 1041   BP: 122/82   Weight: 112 kg (246 lb 12.9 oz)     GENERAL: Well nourished, well developed, in no acute distress.     Procedure:  Speculum was placed in the vagina and cervix visualized. IUD strings were not visualized. An IUD hook was gently advanced through the cervical os; however, IUD could not be removed. Curved Demetria clamp was also used by myself and Dr Miller without successful removal. Entire procedure done under US guidance.  All instruments removed from the vagina. Tenaculum sites hemostatic. The patient tolerated the procedure well.     Assessment/Plan:     Attempted IUD removal, unsuccessful  -     doxycycline (VIBRA-TABS) 100 MG tablet; Take 1 tablet (100 mg total) by mouth 2 (two) times daily.  Dispense: 14 tablet; Refill: 0  -     Case Request Operating Room: HYSTEROSCOPY    Intrauterine contraceptive device threads lost, subsequent encounter  -     Case Request Operating Room: HYSTEROSCOPY    - Rx doxycycline for ppx due to uterine instrumentation  - discussed options and pt amenable to outpatient hysteroscopic IUD removal, will schedule for 24 at [place], case request placed    Contraception: partner's vasectomy

## 2024-04-19 DIAGNOSIS — N76.0 ACUTE VAGINITIS: Primary | ICD-10-CM

## 2024-04-19 NOTE — TELEPHONE ENCOUNTER
----- Message from Patricia Mancini MD sent at 4/18/2024  2:52 PM CDT -----    Requested Date: 5/14   Requested Time: 0700   Case Length:50 minutes    Surgeon: Patricia Mancini      Assistant Surgeon: None   Visit Type: Outpatient    LOCATION: [facility]    PROCEDURE:hysteroscopic IUD removal  Diagnosis:IUD strings lost  Anesthesia type: General   Comments/Special Equipment Needed:  Rep needed: No  Does the patient need a PreOp appointment with MD: Yes  U/S needed at pre-op: No  PCP clearance: Not Needed  When does she need her Post op appointment: 2 weeks in person or virtual  Do you need additional time blocked out from clinic? No  If so, what time do you want to be back in clinic? N/a  When can the patient go back to work? 3 days

## 2024-04-24 RX ORDER — FLUCONAZOLE 200 MG/1
200 TABLET ORAL DAILY
Qty: 2 TABLET | Refills: 0 | Status: SHIPPED | OUTPATIENT
Start: 2024-04-24

## 2024-05-01 ENCOUNTER — TELEPHONE (OUTPATIENT)
Dept: INTERNAL MEDICINE | Facility: CLINIC | Age: 29
End: 2024-05-01
Payer: MEDICAID

## 2024-05-01 NOTE — TELEPHONE ENCOUNTER
----- Message from Nicole Scott sent at 4/30/2024  3:57 PM CDT -----  Type:  Appointment Request         Name of Caller:pt  When is the first available appointment?No access  Symptoms:c/u  Would the patient rather a call back or a response via [facility]? call  Best Call Back Number:[phone]   Additional Information: Pt would like to schedule a check up. Please call pt with further info and assistance. Thank you.

## 2024-05-10 ENCOUNTER — OFFICE VISIT (OUTPATIENT)
Dept: OBSTETRICS AND GYNECOLOGY | Facility: CLINIC | Age: 29
End: 2024-05-10
Attending: STUDENT IN AN ORGANIZED HEALTH CARE EDUCATION/TRAINING PROGRAM
Payer: MEDICAID

## 2024-05-10 VITALS
WEIGHT: 242.31 LBS | SYSTOLIC BLOOD PRESSURE: 118 MMHG | BODY MASS INDEX: 41.37 KG/M2 | DIASTOLIC BLOOD PRESSURE: 84 MMHG | HEIGHT: 64 IN

## 2024-05-10 DIAGNOSIS — T83.32XD INTRAUTERINE CONTRACEPTIVE DEVICE THREADS LOST, SUBSEQUENT ENCOUNTER: Primary | ICD-10-CM

## 2024-05-10 DIAGNOSIS — R10.9 RIGHT SIDED ABDOMINAL PAIN: ICD-10-CM

## 2024-05-10 PROBLEM — T83.32XA IUD THREADS LOST: Status: ACTIVE | Noted: 2024-05-10

## 2024-05-10 PROCEDURE — 99999 PR PBB SHADOW E&M-EST. PATIENT-LVL III: CPT | Mod: PBBFAC,,, | Performed by: STUDENT IN AN ORGANIZED HEALTH CARE EDUCATION/TRAINING PROGRAM

## 2024-05-10 PROCEDURE — 99214 OFFICE O/P EST MOD 30 MIN: CPT | Mod: S$PBB,,, | Performed by: STUDENT IN AN ORGANIZED HEALTH CARE EDUCATION/TRAINING PROGRAM

## 2024-05-10 PROCEDURE — 3074F SYST BP LT 130 MM HG: CPT | Mod: CPTII,,, | Performed by: STUDENT IN AN ORGANIZED HEALTH CARE EDUCATION/TRAINING PROGRAM

## 2024-05-10 PROCEDURE — 1160F RVW MEDS BY RX/DR IN RCRD: CPT | Mod: CPTII,,, | Performed by: STUDENT IN AN ORGANIZED HEALTH CARE EDUCATION/TRAINING PROGRAM

## 2024-05-10 PROCEDURE — 3079F DIAST BP 80-89 MM HG: CPT | Mod: CPTII,,, | Performed by: STUDENT IN AN ORGANIZED HEALTH CARE EDUCATION/TRAINING PROGRAM

## 2024-05-10 PROCEDURE — 99213 OFFICE O/P EST LOW 20 MIN: CPT | Mod: PBBFAC | Performed by: STUDENT IN AN ORGANIZED HEALTH CARE EDUCATION/TRAINING PROGRAM

## 2024-05-10 PROCEDURE — 1159F MED LIST DOCD IN RCRD: CPT | Mod: CPTII,,, | Performed by: STUDENT IN AN ORGANIZED HEALTH CARE EDUCATION/TRAINING PROGRAM

## 2024-05-10 PROCEDURE — 3044F HG A1C LEVEL LT 7.0%: CPT | Mod: CPTII,,, | Performed by: STUDENT IN AN ORGANIZED HEALTH CARE EDUCATION/TRAINING PROGRAM

## 2024-05-10 PROCEDURE — 3008F BODY MASS INDEX DOCD: CPT | Mod: CPTII,,, | Performed by: STUDENT IN AN ORGANIZED HEALTH CARE EDUCATION/TRAINING PROGRAM

## 2024-05-10 RX ORDER — FAMOTIDINE 20 MG/1
20 TABLET, FILM COATED ORAL
Status: CANCELLED | OUTPATIENT
Start: 2024-05-10

## 2024-05-10 RX ORDER — SODIUM CHLORIDE 9 MG/ML
INJECTION, SOLUTION INTRAVENOUS CONTINUOUS
Status: CANCELLED | OUTPATIENT
Start: 2024-05-10

## 2024-05-10 NOTE — H&P
Chief Complaint:  Pre-op Exam (HYSTEROSCOPY 24)      Patient Active Problem List   Diagnosis    IUD threads lost       History of Present Illness    Marlin Jamison is a 28 y.o.  here for preop visit.   IUD strings lost, unsuccessful attempt at removal under US guidance, pt amenable to hysteroscopic removal.    Past Medical History:   Diagnosis Date    Anxiety     Bipolar disorder, unspecified     Depression     PTSD (post-traumatic stress disorder)     STD (sexually transmitted disease)        Past Surgical History:   Procedure Laterality Date    ABDOMINAL SURGERY  2005    APPENDECTOMY      TONSILLECTOMY         Family History   Problem Relation Name Age of Onset    Diabetes Mother Shanel     Depression Mother Shanel     Kidney disease Mother Shanel     Mental illness Mother Shanel     No Known Problems Father         Social History     Socioeconomic History    Marital status: Single   Tobacco Use    Smoking status: Every Day     Current packs/day: 0.50     Average packs/day: 0.5 packs/day for 10.0 years (5.0 ttl pk-yrs)     Types: Cigarettes    Smokeless tobacco: Never   Substance and Sexual Activity    Alcohol use: Yes     Alcohol/week: 8.0 standard drinks of alcohol     Types: 4 Glasses of wine, 4 Shots of liquor per week     Comment: social    Drug use: Not Currently     Types: Marijuana     Comment: social    Sexual activity: Yes     Partners: Female, Male     Birth control/protection: I.U.D.     Social Determinants of Health     Financial Resource Strain: Medium Risk (2024)    Overall Financial Resource Strain (CARDIA)     Difficulty of Paying Living Expenses: Somewhat hard   Food Insecurity: Food Insecurity Present (2024)    Hunger Vital Sign     Worried About Running Out of Food in the Last Year: Never true     Ran Out of Food in the Last Year: Sometimes true   Transportation Needs: Unmet Transportation Needs (2024)    PRAPARE - Transportation     Lack of  "Transportation (Medical): Yes     Lack of Transportation (Non-Medical): Yes   Physical Activity: Insufficiently Active (2024)    Exercise Vital Sign     Days of Exercise per Week: 1 day     Minutes of Exercise per Session: 60 min   Stress: Stress Concern Present (2024)    [place] [employer] - Occupational Stress Questionnaire     Feeling of Stress : Very much   Housing Stability: High Risk (2024)    Housing Stability Vital Sign     Unable to Pay for Housing in the Last Year: Yes     Number of Places Lived in the Last Year: 1     Unstable Housing in the Last Year: No       OB History    Para Term  AB Living   0 0 0 0 0 0   SAB IAB Ectopic Multiple Live Births   0 0 0 0 0       Patient's last menstrual period was 2024 (approximate).       Review of Systems  GENERAL: Denies unintentional weight gain or weight loss. Feeling well overall.   SKIN: Denies rash or lesions.   HEENT: Denies headaches, or vision changes.   CARDIOVASCULAR: Denies palpitations or chest pain.   RESPIRATORY: Denies shortness of breath or dyspnea on exertion.  BREASTS: Denies pain, lumps, or nipple discharge.   ABDOMEN: + intermittent right sided abdominal pain that is mid-upper region -- desires GI referral.   Denies constipation, diarrhea, nausea, vomiting, change in appetite.  URINARY: Denies frequency, dysuria, hematuria.  NEUROLOGIC: Denies syncope or weakness.   PSYCHIATRIC: Denies depression, anxiety or mood swings.     Objective:     /84 (BP Location: Right arm, Patient Position: Sitting, BP Method: Medium (Manual))   Ht 5' 3.5" (1.613 m)   Wt 109.9 kg (242 lb 4.6 oz)   LMP 2024 (Approximate)   BMI 42.25 kg/m²     Body mass index is 42.25 kg/m².    APPEARANCE: Well nourished, well developed, in no acute distress.  PSYCH: Appropriate mood and affect.  SKIN: No acne or hirsutism  NECK: Neck symmetric without masses or thyromegaly  CHEST: Normal respiratory " effort  ABDOMEN: Soft.  No tenderness or masses.    PELVIC: Deferred  EXTREMITIES: No edema    Last Pap: NILM +OHRHPV 3/21/2024  Ultrasound 4/11/2024:   FINDINGS:  Uterus:     Size: 9.1 x 3.8 x 4.2 cm     Masses: None     Endometrium: Normal in this pre menopausal patient, measuring 5 mm.     Intrauterine device is visualized within the endometrial canal.     Right ovary:     Size: 2.8 x 1.9 x 3.8 cm     Appearance: Normal     Vascular flow: Normal.     Left ovary:     Size: 3.1 x 2.1 x 2.3 cm     Appearance: Normal     Vascular Flow: Normal.     Free Fluid:     None.     Impression:     No significant sonographic abnormality.     Intrauterine device is visualized within the endometrial canal.      Assessment/ Plan:     1. Intrauterine contraceptive device threads lost, subsequent encounter        2. Right sided abdominal pain  Ambulatory referral/consult to Gastroenterology          1. To OR for  hysteroscopic IUD removal  on 5/14/24  2. Consents reviewed and signed      Counseling     With the patient I had a full discussion of the risks, benefits, and alternatives of all procedures to be performed, including but not limited to injury to other organs, failure to resolve problem, recurrence of disease state, and infection. We discussed the risks, benefits, and alternatives to blood transfusion as well.    Counseled on additional risks of uterine perforation, fluid overload, need for diagnostic laparoscopy.  She agrees with the plan of care; therefore, we will proceed with the surgery as scheduled.

## 2024-05-10 NOTE — H&P (VIEW-ONLY)
Chief Complaint:  Pre-op Exam (HYSTEROSCOPY 24)      Patient Active Problem List   Diagnosis    IUD threads lost       History of Present Illness    Marlin Jamison is a 28 y.o.  here for preop visit.   IUD strings lost, unsuccessful attempt at removal under US guidance, pt amenable to hysteroscopic removal.    Past Medical History:   Diagnosis Date    Anxiety     Bipolar disorder, unspecified     Depression     PTSD (post-traumatic stress disorder)     STD (sexually transmitted disease)        Past Surgical History:   Procedure Laterality Date    ABDOMINAL SURGERY  2005    APPENDECTOMY      TONSILLECTOMY         Family History   Problem Relation Name Age of Onset    Diabetes Mother Shanel     Depression Mother Shanel     Kidney disease Mother Shanel     Mental illness Mother Shanel     No Known Problems Father         Social History     Socioeconomic History    Marital status: Single   Tobacco Use    Smoking status: Every Day     Current packs/day: 0.50     Average packs/day: 0.5 packs/day for 10.0 years (5.0 ttl pk-yrs)     Types: Cigarettes    Smokeless tobacco: Never   Substance and Sexual Activity    Alcohol use: Yes     Alcohol/week: 8.0 standard drinks of alcohol     Types: 4 Glasses of wine, 4 Shots of liquor per week     Comment: social    Drug use: Not Currently     Types: Marijuana     Comment: social    Sexual activity: Yes     Partners: Female, Male     Birth control/protection: I.U.D.     Social Determinants of Health     Financial Resource Strain: Medium Risk (2024)    Overall Financial Resource Strain (CARDIA)     Difficulty of Paying Living Expenses: Somewhat hard   Food Insecurity: Food Insecurity Present (2024)    Hunger Vital Sign     Worried About Running Out of Food in the Last Year: Never true     Ran Out of Food in the Last Year: Sometimes true   Transportation Needs: Unmet Transportation Needs (2024)    PRAPARE - Transportation     Lack of  "Transportation (Medical): Yes     Lack of Transportation (Non-Medical): Yes   Physical Activity: Insufficiently Active (2024)    Exercise Vital Sign     Days of Exercise per Week: 1 day     Minutes of Exercise per Session: 60 min   Stress: Stress Concern Present (2024)    [place] [employer] - Occupational Stress Questionnaire     Feeling of Stress : Very much   Housing Stability: High Risk (2024)    Housing Stability Vital Sign     Unable to Pay for Housing in the Last Year: Yes     Number of Places Lived in the Last Year: 1     Unstable Housing in the Last Year: No       OB History    Para Term  AB Living   0 0 0 0 0 0   SAB IAB Ectopic Multiple Live Births   0 0 0 0 0       Patient's last menstrual period was 2024 (approximate).       Review of Systems  GENERAL: Denies unintentional weight gain or weight loss. Feeling well overall.   SKIN: Denies rash or lesions.   HEENT: Denies headaches, or vision changes.   CARDIOVASCULAR: Denies palpitations or chest pain.   RESPIRATORY: Denies shortness of breath or dyspnea on exertion.  BREASTS: Denies pain, lumps, or nipple discharge.   ABDOMEN: + intermittent right sided abdominal pain that is mid-upper region -- desires GI referral.   Denies constipation, diarrhea, nausea, vomiting, change in appetite.  URINARY: Denies frequency, dysuria, hematuria.  NEUROLOGIC: Denies syncope or weakness.   PSYCHIATRIC: Denies depression, anxiety or mood swings.     Objective:     /84 (BP Location: Right arm, Patient Position: Sitting, BP Method: Medium (Manual))   Ht 5' 3.5" (1.613 m)   Wt 109.9 kg (242 lb 4.6 oz)   LMP 2024 (Approximate)   BMI 42.25 kg/m²     Body mass index is 42.25 kg/m².    APPEARANCE: Well nourished, well developed, in no acute distress.  PSYCH: Appropriate mood and affect.  SKIN: No acne or hirsutism  NECK: Neck symmetric without masses or thyromegaly  CHEST: Normal respiratory " effort  ABDOMEN: Soft.  No tenderness or masses.    PELVIC: Deferred  EXTREMITIES: No edema    Last Pap: NILM +OHRHPV 3/21/2024  Ultrasound 4/11/2024:   FINDINGS:  Uterus:     Size: 9.1 x 3.8 x 4.2 cm     Masses: None     Endometrium: Normal in this pre menopausal patient, measuring 5 mm.     Intrauterine device is visualized within the endometrial canal.     Right ovary:     Size: 2.8 x 1.9 x 3.8 cm     Appearance: Normal     Vascular flow: Normal.     Left ovary:     Size: 3.1 x 2.1 x 2.3 cm     Appearance: Normal     Vascular Flow: Normal.     Free Fluid:     None.     Impression:     No significant sonographic abnormality.     Intrauterine device is visualized within the endometrial canal.      Assessment/ Plan:     1. Intrauterine contraceptive device threads lost, subsequent encounter        2. Right sided abdominal pain  Ambulatory referral/consult to Gastroenterology          1. To OR for  hysteroscopic IUD removal  on 5/14/24  2. Consents reviewed and signed      Counseling     With the patient I had a full discussion of the risks, benefits, and alternatives of all procedures to be performed, including but not limited to injury to other organs, failure to resolve problem, recurrence of disease state, and infection. We discussed the risks, benefits, and alternatives to blood transfusion as well.    Counseled on additional risks of uterine perforation, fluid overload, need for diagnostic laparoscopy.  She agrees with the plan of care; therefore, we will proceed with the surgery as scheduled.

## 2024-05-13 NOTE — PRE-PROCEDURE INSTRUCTIONS
The following was discussed with pt via phone and sent to pt portal. Pt verbalized understanding. Pt to be accompanied by her partner Kosta.    Dear Marlin ,    You are scheduled for a procedure with Dr. Mancini on 5/14/2024. Your scheduled arrival time is 5:15am.  This arrival time is roughly 2 hours before your anticipated procedure time to allow sufficient time for pre-op.  Please wear comfortable clothes.  This procedure will take place at the [facility] at the corner of [address].  [facility] [address] [facility] [address] [place] [facility] to [facility].  The address is:    [address]  [place] [address]    After entering the building, you will proceed to the second floor where you can check in with registration. You should take any medications that you routinely take for blood pressure (other than those listed below), heart medications, thyroid, cholesterol, etc.     If you wear contact lenses, please wear glasses to your procedure.    Your fasting instructions are as follow:  Nothing to eat after midnight the evening before your surgery. You may drink clear liquids up until 2 hours prior to your arrival time. You MUST have a responsible adult to bring you home.      The evening before and morning of your procedure, please hold the following medications:  -Aspirin and Aspirin-containing products (Goody's powder, Excedrin)  -NSAIDs (Advil, Ibuprofen, Aleve, Diclofenac)  -Vitamins/Supplements  -Herbal remedies/Teas  -Stimulants (Adderall, Vyvanse, Adipex)  -Diabetic medication (Please bring with you day of procedure)  -Prescription creams/gels/lotions  -METHYLPHENIDATE    -May take Tylenol      The evening before and morning of your procedure, take a shower using antibacterial soap (ex: Hibiclens or Dial antibacterial soap). DO NOT apply deodorant, lotion, cologne, or anything else to the skin. Wear loose, comfortable fitting clothing. Do not wear jewelry or bring any  valuables with you. If you wear dentures or contacts, please bring your case with you or leave them at home. Use and bring any inhalers that you may have.    If you have any procedure-specific questions, please call your surgeon's office. Any other questions, don't hesitate to call at [phone].    Thanks,  JOMAR Quinones  Pre-Admit Testing  Anesthesia Dept [facility]

## 2024-05-14 ENCOUNTER — ANESTHESIA EVENT (OUTPATIENT)
Dept: SURGERY | Facility: HOSPITAL | Age: 29
End: 2024-05-14
Payer: MEDICAID

## 2024-05-14 ENCOUNTER — ANESTHESIA (OUTPATIENT)
Dept: SURGERY | Facility: HOSPITAL | Age: 29
End: 2024-05-14
Payer: MEDICAID

## 2024-05-14 ENCOUNTER — HOSPITAL ENCOUNTER (OUTPATIENT)
Facility: HOSPITAL | Age: 29
Discharge: HOME | End: 2024-05-14
Attending: STUDENT IN AN ORGANIZED HEALTH CARE EDUCATION/TRAINING PROGRAM | Admitting: STUDENT IN AN ORGANIZED HEALTH CARE EDUCATION/TRAINING PROGRAM
Payer: MEDICAID

## 2024-05-14 VITALS
TEMPERATURE: 99 F | BODY MASS INDEX: 43.05 KG/M2 | HEIGHT: 63 IN | DIASTOLIC BLOOD PRESSURE: 88 MMHG | RESPIRATION RATE: 20 BRPM | OXYGEN SATURATION: 97 % | WEIGHT: 243 LBS | SYSTOLIC BLOOD PRESSURE: 129 MMHG | HEART RATE: 94 BPM

## 2024-05-14 DIAGNOSIS — Z98.890 STATUS POST HYSTEROSCOPY: Primary | ICD-10-CM

## 2024-05-14 DIAGNOSIS — T83.32XD INTRAUTERINE CONTRACEPTIVE DEVICE THREADS LOST, SUBSEQUENT ENCOUNTER: ICD-10-CM

## 2024-05-14 LAB
B-HCG UR QL: NEGATIVE
CTP QC/QA: YES

## 2024-05-14 PROCEDURE — 27201423 OPTIME MED/SURG SUP & DEVICES STERILE SUPPLY: Performed by: STUDENT IN AN ORGANIZED HEALTH CARE EDUCATION/TRAINING PROGRAM

## 2024-05-14 PROCEDURE — 88305 TISSUE EXAM BY PATHOLOGIST: CPT | Mod: 26,,, | Performed by: PATHOLOGY

## 2024-05-14 PROCEDURE — 37000009 HC ANESTHESIA EA ADD 15 MINS: Performed by: STUDENT IN AN ORGANIZED HEALTH CARE EDUCATION/TRAINING PROGRAM

## 2024-05-14 PROCEDURE — 36000706: Performed by: STUDENT IN AN ORGANIZED HEALTH CARE EDUCATION/TRAINING PROGRAM

## 2024-05-14 PROCEDURE — 25000003 PHARM REV CODE 250: Performed by: NURSE ANESTHETIST, CERTIFIED REGISTERED

## 2024-05-14 PROCEDURE — 88305 TISSUE EXAM BY PATHOLOGIST: CPT | Performed by: PATHOLOGY

## 2024-05-14 PROCEDURE — 71000033 HC RECOVERY, INTIAL HOUR: Performed by: STUDENT IN AN ORGANIZED HEALTH CARE EDUCATION/TRAINING PROGRAM

## 2024-05-14 PROCEDURE — 25000003 PHARM REV CODE 250: Performed by: STUDENT IN AN ORGANIZED HEALTH CARE EDUCATION/TRAINING PROGRAM

## 2024-05-14 PROCEDURE — 99900035 HC TECH TIME PER 15 MIN (STAT)

## 2024-05-14 PROCEDURE — D9220A PRA ANESTHESIA: Mod: ,,, | Performed by: NURSE ANESTHETIST, CERTIFIED REGISTERED

## 2024-05-14 PROCEDURE — 58562 HYSTEROSCOPY REMOVE FB: CPT | Mod: ,,, | Performed by: STUDENT IN AN ORGANIZED HEALTH CARE EDUCATION/TRAINING PROGRAM

## 2024-05-14 PROCEDURE — 81025 URINE PREGNANCY TEST: CPT | Performed by: STUDENT IN AN ORGANIZED HEALTH CARE EDUCATION/TRAINING PROGRAM

## 2024-05-14 PROCEDURE — 71000015 HC POSTOP RECOV 1ST HR: Performed by: STUDENT IN AN ORGANIZED HEALTH CARE EDUCATION/TRAINING PROGRAM

## 2024-05-14 PROCEDURE — 37000008 HC ANESTHESIA 1ST 15 MINUTES: Performed by: STUDENT IN AN ORGANIZED HEALTH CARE EDUCATION/TRAINING PROGRAM

## 2024-05-14 PROCEDURE — 63600175 PHARM REV CODE 636 W HCPCS: Performed by: NURSE ANESTHETIST, CERTIFIED REGISTERED

## 2024-05-14 PROCEDURE — 36000707: Performed by: STUDENT IN AN ORGANIZED HEALTH CARE EDUCATION/TRAINING PROGRAM

## 2024-05-14 PROCEDURE — 94761 N-INVAS EAR/PLS OXIMETRY MLT: CPT

## 2024-05-14 RX ORDER — FENTANYL CITRATE 50 UG/ML
INJECTION, SOLUTION INTRAMUSCULAR; INTRAVENOUS
Status: DISCONTINUED | OUTPATIENT
Start: 2024-05-14 | End: 2024-05-14

## 2024-05-14 RX ORDER — SODIUM CHLORIDE 9 MG/ML
INJECTION, SOLUTION INTRAVENOUS CONTINUOUS
Status: DISCONTINUED | OUTPATIENT
Start: 2024-05-14 | End: 2024-05-14 | Stop reason: HOSPADM

## 2024-05-14 RX ORDER — LIDOCAINE HYDROCHLORIDE 20 MG/ML
INJECTION INTRAVENOUS
Status: DISCONTINUED | OUTPATIENT
Start: 2024-05-14 | End: 2024-05-14

## 2024-05-14 RX ORDER — ONDANSETRON HYDROCHLORIDE 2 MG/ML
INJECTION, SOLUTION INTRAVENOUS
Status: DISCONTINUED | OUTPATIENT
Start: 2024-05-14 | End: 2024-05-14

## 2024-05-14 RX ORDER — DEXMEDETOMIDINE HYDROCHLORIDE 100 UG/ML
INJECTION, SOLUTION INTRAVENOUS
Status: DISCONTINUED | OUTPATIENT
Start: 2024-05-14 | End: 2024-05-14

## 2024-05-14 RX ORDER — MIDAZOLAM HYDROCHLORIDE 1 MG/ML
INJECTION INTRAMUSCULAR; INTRAVENOUS
Status: DISCONTINUED | OUTPATIENT
Start: 2024-05-14 | End: 2024-05-14

## 2024-05-14 RX ORDER — FENTANYL CITRATE 50 UG/ML
25 INJECTION, SOLUTION INTRAMUSCULAR; INTRAVENOUS EVERY 5 MIN PRN
Status: DISCONTINUED | OUTPATIENT
Start: 2024-05-14 | End: 2024-05-14 | Stop reason: HOSPADM

## 2024-05-14 RX ORDER — OXYCODONE AND ACETAMINOPHEN 5; 325 MG/1; MG/1
1 TABLET ORAL
Status: DISCONTINUED | OUTPATIENT
Start: 2024-05-14 | End: 2024-05-14 | Stop reason: HOSPADM

## 2024-05-14 RX ORDER — PHENYLEPHRINE HYDROCHLORIDE 10 MG/ML
INJECTION INTRAVENOUS
Status: DISCONTINUED | OUTPATIENT
Start: 2024-05-14 | End: 2024-05-14

## 2024-05-14 RX ORDER — DEXAMETHASONE SODIUM PHOSPHATE 4 MG/ML
INJECTION, SOLUTION INTRA-ARTICULAR; INTRALESIONAL; INTRAMUSCULAR; INTRAVENOUS; SOFT TISSUE
Status: DISCONTINUED | OUTPATIENT
Start: 2024-05-14 | End: 2024-05-14

## 2024-05-14 RX ORDER — HYDROMORPHONE HYDROCHLORIDE 1 MG/ML
0.2 INJECTION, SOLUTION INTRAMUSCULAR; INTRAVENOUS; SUBCUTANEOUS EVERY 5 MIN PRN
Status: DISCONTINUED | OUTPATIENT
Start: 2024-05-14 | End: 2024-05-14 | Stop reason: HOSPADM

## 2024-05-14 RX ORDER — PROCHLORPERAZINE EDISYLATE 5 MG/ML
5 INJECTION INTRAMUSCULAR; INTRAVENOUS EVERY 30 MIN PRN
Status: DISCONTINUED | OUTPATIENT
Start: 2024-05-14 | End: 2024-05-14 | Stop reason: HOSPADM

## 2024-05-14 RX ORDER — ONDANSETRON HYDROCHLORIDE 2 MG/ML
4 INJECTION, SOLUTION INTRAVENOUS DAILY PRN
Status: DISCONTINUED | OUTPATIENT
Start: 2024-05-14 | End: 2024-05-14 | Stop reason: HOSPADM

## 2024-05-14 RX ORDER — SILVER NITRATE 38.21; 12.74 MG/1; MG/1
STICK TOPICAL
Status: DISCONTINUED | OUTPATIENT
Start: 2024-05-14 | End: 2024-05-14 | Stop reason: HOSPADM

## 2024-05-14 RX ORDER — PROPOFOL 10 MG/ML
VIAL (ML) INTRAVENOUS
Status: DISCONTINUED | OUTPATIENT
Start: 2024-05-14 | End: 2024-05-14

## 2024-05-14 RX ORDER — FAMOTIDINE 20 MG/1
20 TABLET, FILM COATED ORAL
Status: COMPLETED | OUTPATIENT
Start: 2024-05-14 | End: 2024-05-14

## 2024-05-14 RX ADMIN — SODIUM CHLORIDE: 0.9 INJECTION, SOLUTION INTRAVENOUS at 07:05

## 2024-05-14 RX ADMIN — FENTANYL CITRATE 50 MCG: 50 INJECTION, SOLUTION INTRAMUSCULAR; INTRAVENOUS at 07:05

## 2024-05-14 RX ADMIN — SODIUM CHLORIDE: 9 INJECTION, SOLUTION INTRAVENOUS at 05:05

## 2024-05-14 RX ADMIN — DEXMEDETOMIDINE HYDROCHLORIDE 8 MCG: 100 INJECTION, SOLUTION INTRAVENOUS at 07:05

## 2024-05-14 RX ADMIN — PHENYLEPHRINE HYDROCHLORIDE 100 MCG: 10 INJECTION INTRAVENOUS at 07:05

## 2024-05-14 RX ADMIN — FAMOTIDINE 20 MG: 20 TABLET ORAL at 05:05

## 2024-05-14 RX ADMIN — MIDAZOLAM HYDROCHLORIDE 2 MG: 1 INJECTION, SOLUTION INTRAMUSCULAR; INTRAVENOUS at 07:05

## 2024-05-14 RX ADMIN — DEXAMETHASONE SODIUM PHOSPHATE 4 MG: 4 INJECTION INTRA-ARTICULAR; INTRALESIONAL; INTRAMUSCULAR; INTRAVENOUS; SOFT TISSUE at 07:05

## 2024-05-14 RX ADMIN — LIDOCAINE HYDROCHLORIDE 100 MG: 20 INJECTION INTRAVENOUS at 07:05

## 2024-05-14 RX ADMIN — GLYCOPYRROLATE 0.2 MG: 0.2 INJECTION, SOLUTION INTRAMUSCULAR; INTRAVENOUS at 07:05

## 2024-05-14 RX ADMIN — ONDANSETRON 4 MG: 2 INJECTION INTRAMUSCULAR; INTRAVENOUS at 07:05

## 2024-05-14 RX ADMIN — PHENYLEPHRINE HYDROCHLORIDE 200 MCG: 10 INJECTION INTRAVENOUS at 07:05

## 2024-05-14 RX ADMIN — PROPOFOL 200 MG: 10 INJECTION, EMULSION INTRAVENOUS at 07:05

## 2024-05-14 NOTE — OP NOTE
[facility] ([facility])  OBGYN  Operative Note    SUMMARY     Date of Procedure: 5/14/2024     Procedure: Procedure(s) (LRB):  HYSTEROSCOPY (N/A)  REMOVAL, INTRAUTERINE DEVICE (N/A)       Surgeon: Patricia Mancini MD    Pre-Operative Diagnosis:   Attempted IUD removal, unsuccessful [Z53.8, Z97.5]  Intrauterine contraceptive device threads lost, subsequent encounter [T83.32XD]    Post-Operative Diagnosis:   Status post hysteroscopic IUD removal    Anesthesia: General    Technical Procedures Used: Hysteroscopic IUD removal    Description of the Findings of the Procedure: Patient was taken to the operating room where general anesthesia was performed and found to be adequate. She was prepped and draped in the normal sterile fashion in the dorsal lithotomy position in nika stirrups. Bladder was drained with straight catheter. Weighted speculum placed in the posterior vagina and the anterior vagina was elevated with shahrzad retractor. The anterior lip of the cervix was grasped with the single tooth tenaculum. The dilators were used to dilate the cervix enough to allow introduction of the hysteroscope. The hysteroscope was introduced and the Mirena IUD was visualized mid-cavity. Hysteroscopic forceps were introduced and under direct visualization were used to grasp the strings and the IUD was removed from the uterine cavity in tact. The uterine cavity was evaluated and noted to be free of polyps or other anomalies. Bilateral tubal ostia were visualized. Pictures were taken and in placed in chart.  All instruments were then removed from the vagina. Tenaculum sites were hemostatic with silver nitrate placement. The patient tolerated procedure well. Sponge lap and needle counts were correct x 2.    Complications: No    Estimated Blood Loss (EBL): * No values recorded between 5/14/2024  7:27 AM and 5/14/2024  7:53 AM *           Specimens:   Specimen (24h ago, onward)       Start     Ordered    05/14/24  0728  Specimen to Pathology, Surgery Gynecology and Obstetrics  Once        Comments: Pre-op Diagnosis: Attempted IUD removal, unsuccessful [Z53.8, Z97.5]Intrauterine contraceptive device threads lost, subsequent encounter [T83.32XD]Procedure(s):HYSTEROSCOPYREMOVAL, INTRAUTERINE DEVICE Number of specimens: 1Name of specimens: 1. IUD     References:    Click here for ordering Quick Tip   Question Answer Comment   Procedure Type: Gynecology and Obstetrics    Release to patient Immediate        05/14/24 0734                            Condition: Good    Disposition: PACU - hemodynamically stable.    Attestation: There was no qualified resident available for this procedure.

## 2024-05-14 NOTE — PLAN OF CARE
Discharge instructions given and explained to patient and family with verbalization of understanding all instructions. Prescription given pre op and explained next time and doses of each medication. Patients v/s stable, denies n/v and tolerating po, rates pain level tolerable, IV removed, and family at bedside for patient discharge home.

## 2024-05-14 NOTE — ANESTHESIA PREPROCEDURE EVALUATION
05/14/2024  Marlin Jamison is a 28 y.o., female.      Pre-op Assessment    I have reviewed the Patient Summary Reports.     I have reviewed the Nursing Notes. I have reviewed the NPO Status.   I have reviewed the Medications.     Review of Systems  Anesthesia Hx:             Denies Family Hx of Anesthesia complications.    Denies Personal Hx of Anesthesia complications.                      Physical Exam  General: Well nourished    Airway:  Mallampati: II   Mouth Opening: Normal  TM Distance: Normal    Chest/Lungs:  Normal Respiratory Rate    Anesthesia Plan  Type of Anesthesia, risks & benefits discussed:    Anesthesia Type: Gen ETT, Gen Supraglottic Airway, Gen Natural Airway  Intra-op Monitoring Plan: Standard ASA Monitors  Post Op Pain Control Plan: multimodal analgesia  Induction:  IV  Airway Plan: Video  Informed Consent: Informed consent signed with the Patient and all parties understand the risks and agree with anesthesia plan.  All questions answered.   ASA Score: 1  Day of Surgery Review of History & Physical: H&P Update referred to the surgeon/provider.    Ready For Surgery From Anesthesia Perspective.   .      
Reactive airway disease

## 2024-05-14 NOTE — ANESTHESIA PROCEDURE NOTES
Intubation    Date/Time: 5/14/2024 7:10 AM    Performed by: Susan Shirley CRNA  Authorized by: Susan Shirley CRNA    Intubation:     Induction:  Intravenous    Intubated:  Postinduction    Mask Ventilation:  Not attempted    Attempts:  1    Intubation method: LMA.    Difficult Airway Encountered?: No      Complications:  None    Airway Device:  Supraglottic airway/LMA (igel 4)    Airway Device Size:  4.0    Style/Cuff Inflation:  Cuffed (inflated to minimal occlusive pressure)    Secured at:  The lips    Placement Verified By:  Capnometry    Complicating Factors:  None    Findings Post-Intubation:  BS equal bilateral

## 2024-05-14 NOTE — DISCHARGE SUMMARY
[facility] ([facility])  Discharge Summary  OBGYN    Admission date: 5/14/2024  Discharge date: 05/14/2024    Admit Dx:      Patient Active Problem List   Diagnosis    IUD threads lost    Status post hysteroscopic IUD removal     Discharge Dx:    Patient Active Problem List   Diagnosis    IUD threads lost    Status post hysteroscopic IUD removal       Attending Physician: Patricia Mancini   Discharge Provider: Patricia Mancini    Procedures Performed: Procedure(s) (LRB):  HYSTEROSCOPY (N/A)  REMOVAL, INTRAUTERINE DEVICE (N/A)    Hospital Course: Patient was admitted on 5/14/2024 to undergo hysteroscopy w/IUD removal secondary to  lost IUD strings with unsuccessful removal in the office .  Procedure was uncomplicated, for full details see operative report. Barring any unforseen incidents, patient will be discharged home when she is able to ambulate, void, and tolerate PO intake.    Consults: none    Significant Diagnostic Studies:   Lab Results   Component Value Date    WBC 7.25 03/04/2024    HGB 14.1 03/04/2024    HCT 43.6 03/04/2024    MCV 94 03/04/2024     03/04/2024     US Pelvis Comp with Transvag NON-OB (xpd  Narrative: EXAMINATION:  US PELVIS COMP WITH TRANSVAG NON-OB (XPD)    CLINICAL HISTORY:  Right lower quadrant pain    TECHNIQUE:  Transabdominal sonography of the pelvis was performed, followed by transvaginal sonography to better evaluate the uterus and ovaries.    COMPARISON:  None.    FINDINGS:  Uterus:    Size: 9.1 x 3.8 x 4.2 cm    Masses: None    Endometrium: Normal in this pre menopausal patient, measuring 5 mm.    Intrauterine device is visualized within the endometrial canal.    Right ovary:    Size: 2.8 x 1.9 x 3.8 cm    Appearance: Normal    Vascular flow: Normal.    Left ovary:    Size: 3.1 x 2.1 x 2.3 cm    Appearance: Normal    Vascular Flow: Normal.    Free Fluid:    None.  Impression: No significant sonographic abnormality.    Intrauterine device is visualized within  the endometrial canal.    Electronically signed by: Taras Weinberg  Date:    04/11/2024  Time:    11:56      Discharged Condition: stable    Disposition: Home    Follow Up:   Future Appointments   Date Time Provider Department Center   5/27/2024 10:00 AM Stephanie Ortiz PA-C [facility] [address] [facility]   5/30/2024  9:30 AM Patricia Mancini MD [facility]         Medications:  Current Discharge Medication List        CONTINUE these medications which have NOT CHANGED    Details   clonazePAM (KLONOPIN) 0.5 MG disintegrating tablet       doxycycline (VIBRA-TABS) 100 MG tablet Take 1 tablet (100 mg total) by mouth 2 (two) times daily.  Qty: 14 tablet, Refills: 0    Associated Diagnoses: Attempted IUD removal, unsuccessful      fluconazole (DIFLUCAN) 200 MG Tab Take 1 tablet (200 mg total) by mouth once daily. Take 1 tablet by mouth, if symptoms persist, take second tablet in three days  Qty: 2 tablet, Refills: 0    Associated Diagnoses: Acute vaginitis      FLUoxetine 20 MG capsule       lamoTRIgine 200 mg TbDL       methylphenidate HCl (METADATE CD) 20 MG CR capsule Take 20 mg by mouth.      naproxen (NAPROSYN) 500 MG tablet Take 1 tablet (500 mg total) by mouth 2 (two) times daily with meals. DO NOT TAKE WITH OTHER NSAIDS (ibuprofen, aspirin, etc)  Qty: 30 tablet, Refills: 1    Associated Diagnoses: Dysmenorrhea      triamcinolone acetonide 0.1% (KENALOG) 0.1 % cream Apply topically 2 (two) times daily.  Qty: 80 g, Refills: 0    Associated Diagnoses: Eczema, unspecified type             Discharge Procedure Orders   Diet Adult Regular     Pelvic Rest   Order Comments: No intercourse or tampons for 3 days     Notify your health care provider if you experience any of the following:  temperature >100.4     Notify your health care provider if you experience any of the following:  persistent nausea and vomiting or diarrhea     Notify your health care provider if you experience any of the following:  severe uncontrolled  pain     Notify your health care provider if you experience any of the following:  difficulty breathing or increased cough     Notify your health care provider if you experience any of the following:  severe persistent headache     Notify your health care provider if you experience any of the following:  worsening rash     Notify your health care provider if you experience any of the following:  persistent dizziness, light-headedness, or visual disturbances     Notify your health care provider if you experience any of the following:  increased confusion or weakness     Notify your health care provider if you experience any of the following:   Order Comments: Vaginal bleeding more than 1 pad in 1 hour     Activity as tolerated

## 2024-05-14 NOTE — PLAN OF CARE
Pt arrived to recovery bay 20 via stretcher per OR team. Bedside report received. Pt attached to bedside monitor. VSS. Pt awake and alert post procedure. Pt on 6 L of oxygen via simple face mask; oxygen sats 98%. Pt IV access infusing with NS.

## 2024-05-14 NOTE — PLAN OF CARE
Chart reviewed. Pre-op nursing care completed per orders. Safe surgery checklist complete. Family at bedside, pt belongings placed in PACU locker 7. Waiting for H&P update and anesthesia consent prior to procedure. Call button within reach.

## 2024-05-14 NOTE — TRANSFER OF CARE
"Anesthesia Transfer of Care Note    Patient: Marlin Jamison    Procedure(s) Performed: Procedure(s) (LRB):  HYSTEROSCOPY (N/A)  REMOVAL, INTRAUTERINE DEVICE (N/A)    Patient location: PACU    Anesthesia Type: general    Transport from OR: Transported from OR on 6-10 L/min O2 by face mask with adequate spontaneous ventilation    Post pain: adequate analgesia    Post assessment: no apparent anesthetic complications    Post vital signs: stable    Level of consciousness: awake    Nausea/Vomiting: no nausea/vomiting    Complications: none    Transfer of care protocol was followed      Last vitals: Visit Vitals  /78 (BP Location: Left arm, Patient Position: Lying)   Pulse 96   Temp 37.2 °C (99 °F) (Temporal)   Resp 16   Ht 5' 3" (1.6 m)   Wt 110.2 kg (243 lb)   SpO2 96%   Breastfeeding No   BMI 43.05 kg/m²     "

## 2024-05-14 NOTE — ANESTHESIA POSTPROCEDURE EVALUATION
Anesthesia Post Evaluation    Patient: Marlin Jamison    Procedure(s) Performed: Procedure(s) (LRB):  HYSTEROSCOPY (N/A)  REMOVAL, INTRAUTERINE DEVICE (N/A)    Final Anesthesia Type: general      Patient location during evaluation: PACU  Patient participation: Yes- Able to Participate  Level of consciousness: awake and alert  Post-procedure vital signs: reviewed and stable  Pain management: adequate  Airway patency: patent    PONV status at discharge: No PONV  Anesthetic complications: no      Cardiovascular status: stable  Respiratory status: spontaneous ventilation  Hydration status: euvolemic  Follow-up not needed.          Vitals Value Taken Time   /88 05/14/24 0825   Temp 37 °C (98.6 °F) 05/14/24 0750   Pulse 94 05/14/24 0825   Resp 22 05/14/24 0825   SpO2 97 % 05/14/24 0825   Vitals shown include unfiled device data.      Event Time   Out of Recovery 08:05:25         Pain/Sánchez Score: Sánchez Score: 10 (5/14/2024  8:00 AM)

## 2024-05-16 ENCOUNTER — PATIENT MESSAGE (OUTPATIENT)
Dept: OBSTETRICS AND GYNECOLOGY | Facility: CLINIC | Age: 29
End: 2024-05-16
Payer: MEDICAID

## 2024-05-16 ENCOUNTER — TELEPHONE (OUTPATIENT)
Dept: GASTROENTEROLOGY | Facility: CLINIC | Age: 29
End: 2024-05-16
Payer: MEDICAID

## 2024-05-16 LAB
FINAL PATHOLOGIC DIAGNOSIS: NORMAL
GROSS: NORMAL
Lab: NORMAL

## 2024-05-16 NOTE — TELEPHONE ENCOUNTER
Left message for patient to call our office to schedule office visit.    ----- Message from Rai Browne sent at 5/16/2024 10:50 AM CDT -----  Type:  Sooner Apoointment Request    Caller is requesting a sooner appointment.  Caller declined first available appointment listed below.  Caller will not accept being placed on the waitlist and is requesting a message be sent to doctor.  Name of Caller:pt  When is the first available appointment?-   Symptoms: has a referral - abd pain  Would the patient rather a call back or a response via [facility]? call  Best Call Back Number: [phone]  Additional Information: pt states she would like a call from office if possible. Thank you

## 2024-05-17 ENCOUNTER — HOSPITAL ENCOUNTER (OUTPATIENT)
Dept: RADIOLOGY | Facility: HOSPITAL | Age: 29
Discharge: HOME | End: 2024-05-17
Attending: PHYSICIAN ASSISTANT
Payer: MEDICAID

## 2024-05-17 DIAGNOSIS — G89.29 CHRONIC PAIN OF LEFT KNEE: ICD-10-CM

## 2024-05-17 DIAGNOSIS — M25.562 CHRONIC PAIN OF LEFT KNEE: ICD-10-CM

## 2024-05-17 PROCEDURE — 73562 X-RAY EXAM OF KNEE 3: CPT | Mod: 26,LT,, | Performed by: RADIOLOGY

## 2024-05-17 PROCEDURE — 73562 X-RAY EXAM OF KNEE 3: CPT | Mod: TC,LT

## 2024-05-31 ENCOUNTER — TELEPHONE (OUTPATIENT)
Dept: OBSTETRICS AND GYNECOLOGY | Facility: CLINIC | Age: 29
End: 2024-05-31
Payer: MEDICAID

## 2024-05-31 NOTE — TELEPHONE ENCOUNTER
----- Message from Patricia Mancini MD sent at 5/30/2024  5:31 PM CDT -----  Can you please schedule a virtual postop visit next week? Looks like she no-showed today but said she could make a virtual visit in a message. Thanks

## 2024-05-31 NOTE — TELEPHONE ENCOUNTER
Calling pt regarding post op appointment. States that she would like to do virtual visit. Scheduled for next available.

## 2024-06-05 ENCOUNTER — OFFICE VISIT (OUTPATIENT)
Dept: OBSTETRICS AND GYNECOLOGY | Facility: CLINIC | Age: 29
End: 2024-06-05
Payer: MEDICAID

## 2024-06-05 DIAGNOSIS — Z09 POSTOP CHECK: ICD-10-CM

## 2024-06-05 DIAGNOSIS — Z98.890 STATUS POST HYSTEROSCOPY: Primary | ICD-10-CM

## 2024-06-05 PROCEDURE — 1160F RVW MEDS BY RX/DR IN RCRD: CPT | Mod: CPTII,95,, | Performed by: STUDENT IN AN ORGANIZED HEALTH CARE EDUCATION/TRAINING PROGRAM

## 2024-06-05 PROCEDURE — 1159F MED LIST DOCD IN RCRD: CPT | Mod: CPTII,95,, | Performed by: STUDENT IN AN ORGANIZED HEALTH CARE EDUCATION/TRAINING PROGRAM

## 2024-06-05 PROCEDURE — 3044F HG A1C LEVEL LT 7.0%: CPT | Mod: CPTII,95,, | Performed by: STUDENT IN AN ORGANIZED HEALTH CARE EDUCATION/TRAINING PROGRAM

## 2024-06-05 PROCEDURE — 99024 POSTOP FOLLOW-UP VISIT: CPT | Mod: 95,,, | Performed by: STUDENT IN AN ORGANIZED HEALTH CARE EDUCATION/TRAINING PROGRAM

## 2024-06-05 NOTE — PROGRESS NOTES
The patient location is: home [place]  The chief complaint leading to consultation is: post-op    Visit type: audiovisual    Face to Face time with patient: 7 mins  10  minutes of total time spent on the encounter, which includes face to face time and non-face to face time preparing to see the patient (eg, review of tests), Obtaining and/or reviewing separately obtained history, Documenting clinical information in the electronic or other health record, Independently interpreting results (not separately reported) and communicating results to the patient/family/caregiver, or Care coordination (not separately reported).         Each patient to whom he or she provides medical services by telemedicine is:  (1) informed of the relationship between the physician and patient and the respective role of any other health care provider with respect to management of the patient; and (2) notified that he or she may decline to receive medical services by telemedicine and may withdraw from such care at any time.    Notes:     Chief Complaint: Post-op visit     Subjective:      Marlin Jamison is a 28 y.o.  who presents to the clinic 2 weeks status post  hysteroscopic IUD removal  for  lost IUD strings . The patient is not having any pain. She describes her vaginal bleeding as none. Expects a cycle this upcoming week. Using partner's vasectomy for contraception. Had a yeast infection last week that resolved with diflucan. Advised daily probiotic. No additional concerns or complaints. Doing great.     Objective:   There were no vitals taken for this visit.    General:  alert, well appearing, and in no distress      Pathology:  Small fragments of benign endometrial tissue admixed with blood clot and mucus.  In addition, gross diagnosis:  Intrauterine device.     Hysteroscopic findings:  Uterine cavity was evaluated and noted to be free of polyps or other anomalies. Bilateral tubal ostia were visualized.     Assessment:     28 y.o.   s/p  hysteroscopic IUD removal  2 weeks ago  - doing well    Plan:   - doing great  - partner vasectomy for contraception  - back to work  - recommend daily probiotic   - rtc when due for annual next March    Patricia Mancini MD  Obstetrics and Gynecology  [facility]

## 2024-06-11 DIAGNOSIS — L30.9 ECZEMA, UNSPECIFIED TYPE: ICD-10-CM

## 2024-06-11 RX ORDER — TRIAMCINOLONE ACETONIDE 1 MG/G
CREAM TOPICAL 2 TIMES DAILY
Qty: 80 G | Refills: 0 | Status: SHIPPED | OUTPATIENT
Start: 2024-06-11

## 2024-06-13 ENCOUNTER — TELEPHONE (OUTPATIENT)
Dept: INTERNAL MEDICINE | Facility: CLINIC | Age: 29
End: 2024-06-13
Payer: MEDICAID

## 2024-06-13 NOTE — TELEPHONE ENCOUNTER
Called and spoke to pt on the phone -- attempted to book her in for an appointment but pt busy norma and requested call back in about 1 hour

## 2024-06-13 NOTE — TELEPHONE ENCOUNTER
----- Message from Lor Gabriel sent at 6/11/2024  3:28 PM CDT -----  Type: Appointment Request     Name of Caller: TOM SWEENEY [[id number]]    When is the first available appointment? Do not have access    Reason for Visit:  rash and blister on arm, hand breaking out in hives itching     Best Call Back Number:  [phone]    Additional Information:

## 2024-06-27 ENCOUNTER — TELEPHONE (OUTPATIENT)
Dept: OBSTETRICS AND GYNECOLOGY | Facility: CLINIC | Age: 29
End: 2024-06-27
Payer: MEDICAID

## 2024-06-27 DIAGNOSIS — Z97.5 ATTEMPTED IUD REMOVAL, UNSUCCESSFUL: ICD-10-CM

## 2024-06-27 DIAGNOSIS — L30.9 ECZEMA, UNSPECIFIED TYPE: ICD-10-CM

## 2024-06-27 DIAGNOSIS — Z53.8 ATTEMPTED IUD REMOVAL, UNSUCCESSFUL: ICD-10-CM

## 2024-06-27 RX ORDER — TRIAMCINOLONE ACETONIDE 1 MG/G
CREAM TOPICAL 2 TIMES DAILY
Qty: 80 G | Refills: 0 | Status: SHIPPED | OUTPATIENT
Start: 2024-06-27

## 2024-06-27 NOTE — TELEPHONE ENCOUNTER
Refill Routing Note   Medication(s) are not appropriate for processing by [facility] for the following reason(s):        Outside of protocol    [facility] action(s):  Route               Appointments  past 12m or future 3m with PCP    Date Provider   Last Visit   6/5/2024 Patricia Mancini MD   Next Visit   Visit date not found Patricia Mancini MD   ED visits in past 90 days: 0        Note composed:5:29 PM 06/27/2024

## 2024-07-08 RX ORDER — FLUCONAZOLE 150 MG/1
150 TABLET ORAL DAILY
Qty: 2 TABLET | Refills: 0 | Status: SHIPPED | OUTPATIENT
Start: 2024-07-08 | End: 2024-07-10

## 2024-07-08 RX ORDER — DOXYCYCLINE HYCLATE 100 MG
100 TABLET ORAL 2 TIMES DAILY
Qty: 14 TABLET | Refills: 0 | OUTPATIENT
Start: 2024-07-08

## 2024-07-29 ENCOUNTER — E-VISIT (OUTPATIENT)
Dept: OBSTETRICS AND GYNECOLOGY | Facility: CLINIC | Age: 29
End: 2024-07-29
Payer: MEDICAID

## 2024-07-29 DIAGNOSIS — N76.0 RECURRENT VAGINITIS: Primary | ICD-10-CM

## 2024-07-29 NOTE — PROGRESS NOTES
Patient ID: Marlin Jamison is a 28 y.o. female.    Chief Complaint: Vaginal Discharge (Entered automatically based on patient selection in [employer].)    The patient initiated a request through [employer] on 7/29/2024 for evaluation and management with a chief complaint of Vaginal Discharge (Entered automatically based on patient selection in [employer].)     I evaluated the questionnaire submission on 7/29/24.    [facility] Peq Evisit Vaginal Concerns    7/29/2024  2:02 PM CDT - Filed by Patient   Do you agree to participate in an E-Visit? Yes   If you have any of the following symptoms,  please do not complete an E-Visit,  schedule an appointment with your provider: I acknowledge   Are you pregnant, could you be pregnant, or are you breast feeding? None of the above   What is the main issue you would like addressed today? Current BV. Dr jane has recommended boric acid, can't afford and need perscription   Which of the following vaginal concerns do you have? Other   Describe your symptoms. Odor   Do you have pain while passing urine? No   Do you have any of the following symptoms? None of the above    Have you taken antibiotics in the last two weeks? No    Do you use any of the following? No   Which of the following applies to your menstrual period? Expect soon   Which of the following applies to your menstrual cycle? Heavier bleeding than normal   Do you have spotting between periods? No   Do you have pain with your period? No   Have you had similar symptoms in the past? More than once   When you had similar symptoms in the past, did any of the following work? None of the above   Have you had a temperature of 100.4 or higher? No   Provide any additional information you feel is important. I have discussed the use of boric suppositories, i just can't afford right now and requesting a perscription for it sent to [facility] pharmacy   Please attach any relevant images or files    Are you able to take your vital signs?  No         Encounter Diagnosis   Name Primary?    Recurrent vaginitis Yes        No orders of the defined types were placed in this encounter.     Medications Ordered This Encounter   Medications    boric acid (BORIC ACID) vaginal suppository     Sig: Use one suppository vaginal every night for 2 weeks then do twice a week.     Dispense:  30 suppository     Refill:  2        No follow-ups on file.      E-Visit Time Tracking:    Day 1 Time (in minutes): 5    Total Time (in minutes): 5

## 2024-08-04 DIAGNOSIS — L30.9 ECZEMA, UNSPECIFIED TYPE: ICD-10-CM

## 2024-08-05 RX ORDER — TRIAMCINOLONE ACETONIDE 1 MG/G
CREAM TOPICAL
Qty: 80 G | Refills: 0 | Status: SHIPPED | OUTPATIENT
Start: 2024-08-05

## 2024-08-08 ENCOUNTER — OFFICE VISIT (OUTPATIENT)
Facility: CLINIC | Age: 29
End: 2024-08-08
Payer: MEDICAID

## 2024-08-08 VITALS
BODY MASS INDEX: 44.01 KG/M2 | SYSTOLIC BLOOD PRESSURE: 128 MMHG | TEMPERATURE: 98 F | DIASTOLIC BLOOD PRESSURE: 80 MMHG | HEIGHT: 62 IN | HEART RATE: 97 BPM | RESPIRATION RATE: 18 BRPM | OXYGEN SATURATION: 98 % | WEIGHT: 239.19 LBS

## 2024-08-08 DIAGNOSIS — K21.9 GASTROESOPHAGEAL REFLUX DISEASE WITHOUT ESOPHAGITIS: ICD-10-CM

## 2024-08-08 DIAGNOSIS — M54.2 NECK PAIN: Primary | ICD-10-CM

## 2024-08-08 PROCEDURE — 1159F MED LIST DOCD IN RCRD: CPT | Mod: CPTII,,,

## 2024-08-08 PROCEDURE — 99999 PR PBB SHADOW E&M-EST. PATIENT-LVL V: CPT | Mod: PBBFAC,,,

## 2024-08-08 PROCEDURE — 3008F BODY MASS INDEX DOCD: CPT | Mod: CPTII,,,

## 2024-08-08 PROCEDURE — 3044F HG A1C LEVEL LT 7.0%: CPT | Mod: CPTII,,,

## 2024-08-08 PROCEDURE — 3079F DIAST BP 80-89 MM HG: CPT | Mod: CPTII,,,

## 2024-08-08 PROCEDURE — 3074F SYST BP LT 130 MM HG: CPT | Mod: CPTII,,,

## 2024-08-08 PROCEDURE — 99215 OFFICE O/P EST HI 40 MIN: CPT | Mod: PBBFAC,PN

## 2024-08-08 PROCEDURE — 99214 OFFICE O/P EST MOD 30 MIN: CPT | Mod: S$PBB,,,

## 2024-08-08 RX ORDER — OMEPRAZOLE 40 MG/1
40 CAPSULE, DELAYED RELEASE ORAL DAILY
Qty: 30 CAPSULE | Refills: 11 | Status: SHIPPED | OUTPATIENT
Start: 2024-08-08 | End: 2025-08-08

## 2024-08-08 RX ORDER — DICLOFENAC SODIUM 75 MG/1
75 TABLET, DELAYED RELEASE ORAL 2 TIMES DAILY PRN
Qty: 60 TABLET | Refills: 0 | Status: SHIPPED | OUTPATIENT
Start: 2024-08-08 | End: 2024-09-07

## 2024-08-08 RX ORDER — LIDOCAINE 50 MG/G
1 PATCH TOPICAL DAILY
Qty: 30 PATCH | Refills: 0 | Status: SHIPPED | OUTPATIENT
Start: 2024-08-08 | End: 2024-09-07

## 2024-08-08 RX ORDER — CYCLOBENZAPRINE HCL 5 MG
5 TABLET ORAL 3 TIMES DAILY PRN
Qty: 30 TABLET | Refills: 0 | Status: SHIPPED | OUTPATIENT
Start: 2024-08-08 | End: 2024-08-18

## 2024-08-15 ENCOUNTER — TELEPHONE (OUTPATIENT)
Facility: CLINIC | Age: 29
End: 2024-08-15

## 2024-08-15 DIAGNOSIS — F41.1 GENERALIZED ANXIETY DISORDER: ICD-10-CM

## 2024-08-15 DIAGNOSIS — F90.9 ATTENTION DEFICIT HYPERACTIVITY DISORDER (ADHD), UNSPECIFIED ADHD TYPE: Primary | ICD-10-CM

## 2024-08-15 RX ORDER — CLONAZEPAM 0.5 MG/1
0.5 TABLET, ORALLY DISINTEGRATING ORAL NIGHTLY PRN
Qty: 30 TABLET | Refills: 0 | Status: SHIPPED | OUTPATIENT
Start: 2024-08-26 | End: 2024-09-25

## 2024-08-15 RX ORDER — METHYLPHENIDATE HYDROCHLORIDE 20 MG/1
20 CAPSULE, EXTENDED RELEASE ORAL EVERY MORNING
Qty: 30 CAPSULE | Refills: 0 | Status: SHIPPED | OUTPATIENT
Start: 2024-08-15 | End: 2024-09-14

## 2024-08-15 NOTE — TELEPHONE ENCOUNTER
----- Message from Colleen Ovalles MD sent at 8/15/2024 10:18 AM CDT -----  Pt tried to do evisit for controlled substances. This is not appropriate. Needs to be seen in clinic. Please call to schedule in person visit. Thanks!

## 2024-08-15 NOTE — TELEPHONE ENCOUNTER
Discussed w/ pt, she is unable to establish until 9/12. Will refill medications prior to that date to prevent withdrawal.     Colleen Ovalles MD 08/15/2024

## 2024-08-31 ENCOUNTER — HOSPITAL ENCOUNTER (OUTPATIENT)
Dept: RADIOLOGY | Facility: HOSPITAL | Age: 29
Discharge: HOME | End: 2024-08-31
Payer: MEDICAID

## 2024-08-31 DIAGNOSIS — M54.2 NECK PAIN: ICD-10-CM

## 2024-08-31 PROCEDURE — 72052 X-RAY EXAM NECK SPINE 6/>VWS: CPT | Mod: 26,,, | Performed by: RADIOLOGY

## 2024-08-31 PROCEDURE — 72052 X-RAY EXAM NECK SPINE 6/>VWS: CPT | Mod: TC

## 2024-09-15 ENCOUNTER — HOSPITAL ENCOUNTER (EMERGENCY)
Facility: OTHER | Age: 29
Discharge: HOME | End: 2024-09-15
Attending: EMERGENCY MEDICINE
Payer: MEDICAID

## 2024-09-15 VITALS
HEART RATE: 99 BPM | SYSTOLIC BLOOD PRESSURE: 172 MMHG | TEMPERATURE: 99 F | DIASTOLIC BLOOD PRESSURE: 103 MMHG | OXYGEN SATURATION: 97 % | RESPIRATION RATE: 20 BRPM

## 2024-09-15 DIAGNOSIS — M79.669 CALF PAIN: Primary | ICD-10-CM

## 2024-09-15 DIAGNOSIS — M79.662 PAIN OF LEFT CALF: ICD-10-CM

## 2024-09-15 PROCEDURE — 99284 EMERGENCY DEPT VISIT MOD MDM: CPT | Mod: 25

## 2024-09-15 PROCEDURE — 25000003 PHARM REV CODE 250: Performed by: EMERGENCY MEDICINE

## 2024-09-15 RX ORDER — ONDANSETRON 4 MG/1
4 TABLET, ORALLY DISINTEGRATING ORAL
Status: COMPLETED | OUTPATIENT
Start: 2024-09-15 | End: 2024-09-15

## 2024-09-15 RX ORDER — IBUPROFEN 600 MG/1
600 TABLET ORAL EVERY 6 HOURS PRN
Qty: 20 TABLET | Refills: 0 | Status: SHIPPED | OUTPATIENT
Start: 2024-09-15

## 2024-09-15 RX ORDER — HYDROCODONE BITARTRATE AND ACETAMINOPHEN 5; 325 MG/1; MG/1
1 TABLET ORAL EVERY 4 HOURS PRN
Qty: 12 TABLET | Refills: 0 | Status: SHIPPED | OUTPATIENT
Start: 2024-09-15 | End: 2024-09-25

## 2024-09-15 RX ORDER — IBUPROFEN 600 MG/1
600 TABLET ORAL
Status: COMPLETED | OUTPATIENT
Start: 2024-09-15 | End: 2024-09-15

## 2024-09-15 RX ORDER — CLONAZEPAM 0.5 MG/1
0.5 TABLET ORAL
Status: COMPLETED | OUTPATIENT
Start: 2024-09-15 | End: 2024-09-15

## 2024-09-15 RX ADMIN — IBUPROFEN 600 MG: 600 TABLET, FILM COATED ORAL at 05:09

## 2024-09-15 RX ADMIN — CLONAZEPAM 0.5 MG: 0.5 TABLET ORAL at 05:09

## 2024-09-15 RX ADMIN — ONDANSETRON 4 MG: 4 TABLET, ORALLY DISINTEGRATING ORAL at 05:09

## 2024-09-15 NOTE — ED NOTES
Pt extremely anxious, hyperventilating, nauseated, attempted to instruct slow deep breathing exercises w/ poor success, Dr. Sr at bedside

## 2024-09-15 NOTE — ED PROVIDER NOTES
"Encounter Date: 9/15/2024       History     Chief Complaint   Patient presents with    Leg Pain     To R leg s/p bending leg and hearing a "snap" to back of R calf. No obvious deformity. Unable to bear weight. Pt tearful in triage.     28-year-old female presents with complaint of sudden onset of left calf pain.  She was next to her car when she stretched down suddenly and felt a "pop" and sudden onset of severe pain in her posterior calf.  She states it is too painful for her to ambulate.  Currently, pain is rated 8/10.  She states she is having a panic attack due to anxiety about being in the ER.  She requests dose of home Klonopin which she takes p.r.n..    The history is provided by the patient.     Review of patient's allergies indicates:  No Known Allergies  Past Medical History:   Diagnosis Date    Anxiety     Bipolar disorder, unspecified     Depression     PTSD (post-traumatic stress disorder)     STD (sexually transmitted disease)      Past Surgical History:   Procedure Laterality Date    ABDOMINAL SURGERY  1/01/2005    APPENDECTOMY      HYSTEROSCOPY N/A 05/14/2024    Procedure: HYSTEROSCOPY;  Surgeon: Patricia Mancini MD;  Location: [facility] OR;  Service: OB/GYN;  Laterality: N/A;  with IUD removal    REMOVAL OF INTRAUTERINE DEVICE (IUD) N/A 05/14/2024    Procedure: REMOVAL, INTRAUTERINE DEVICE;  Surgeon: Patricia Mancini MD;  Location: [facility] OR;  Service: OB/GYN;  Laterality: N/A;    TONSILLECTOMY  2002     Family History   Problem Relation Name Age of Onset    Diabetes Mother Shanel     Depression Mother Shanel     Kidney disease Mother Shanel     Mental illness Mother Shanel     No Known Problems Father       Social History     Tobacco Use    Smoking status: Every Day     Current packs/day: 0.50     Average packs/day: 0.5 packs/day for 10.0 years (5.0 ttl pk-yrs)     Types: Cigarettes    Smokeless tobacco: Never   Substance Use Topics    Alcohol use: Yes     Alcohol/week: 8.0 standard drinks of alcohol     " Types: 4 Glasses of wine, 4 Shots of liquor per week     Comment: social    Drug use: Not Currently     Types: Marijuana     Comment: social     Review of Systems   Constitutional:  Negative for chills and fever.   HENT:  Negative for congestion and sore throat.    Eyes:  Negative for visual disturbance.   Respiratory:  Negative for cough and shortness of breath.    Cardiovascular:  Negative for chest pain and palpitations.   Gastrointestinal:  Negative for abdominal pain, diarrhea and vomiting.   Genitourinary:  Negative for decreased urine volume, dysuria and vaginal discharge.   Musculoskeletal:  Positive for myalgias (Left calf). Negative for joint swelling, neck pain and neck stiffness.   Skin:  Negative for rash and wound.   Neurological:  Negative for weakness, numbness and headaches.   Psychiatric/Behavioral:  Negative for confusion. The patient is nervous/anxious.        Physical Exam     Initial Vitals [09/15/24 1619]   BP Pulse Resp Temp SpO2   (!) 172/103 99 20 98.5 °F (36.9 °C) 97 %      MAP       --         Physical Exam    Nursing note and vitals reviewed.  Constitutional: She appears well-developed and well-nourished. No distress.   Patient is examined while seated in a chair.  No definite positive on Toledo's test.  No definite tenderness along Achilles tendon.  She is able to dorsiflex and plantar flex the foot, but limited.   HENT:   Head: Normocephalic and atraumatic.   Mouth/Throat: Oropharynx is clear and moist. No oropharyngeal exudate.   Eyes: Conjunctivae and EOM are normal. Pupils are equal, round, and reactive to light.   Neck: Neck supple.   Cardiovascular:  Normal rate and normal heart sounds.           No murmur heard.  Pulmonary/Chest: Breath sounds normal. No respiratory distress. She has no wheezes. She has no rhonchi. She has no rales.   Abdominal: Abdomen is soft. There is no abdominal tenderness. There is no rebound and no guarding.   Musculoskeletal:         General: Tenderness  (Right posterior calf) present. No edema.      Cervical back: Neck supple.     Neurological: She is alert and oriented to person, place, and time. She has normal strength. GCS score is 15. GCS eye subscore is 4. GCS verbal subscore is 5. GCS motor subscore is 6.   Skin: Skin is warm and dry. No rash noted.   Psychiatric: She has a normal mood and affect. Thought content normal.         ED Course   Procedures  Labs Reviewed - No data to display         Imaging Results              US Soft Tissue, Lower Extremity, Right (Final result)  Result time 09/15/24 18:18:32      Final result by Woodrow Beltran MD (09/15/24 18:18:32)                   Impression:      No evidence of deep venous thrombosis in the right lower extremity.    Negative ultrasound of the right Achilles tendon for disruption or delamination.  If further imaging is clinically warranted, consider MRI of the ankle.      Electronically signed by: Woodrow Beltran  Date:    09/15/2024  Time:    18:18               Narrative:    EXAMINATION:  US LOWER EXTREMITY VEINS RIGHT; US SOFT TISSUE, LOWER EXTREMITY, RIGHT    CLINICAL HISTORY:  possible achilles tear/rupture; Pain in unspecified lower leg    TECHNIQUE:  Duplex and color flow Doppler evaluation and graded compression of the right lower extremity veins was performed.    Additional imaging of the area of concern in the soft tissues of the Achilles tendon were performed.  Comparison imaging of the left side was scanned for comparison.    COMPARISON:  None    FINDINGS:  Right thigh veins: The common femoral, femoral, popliteal, upper greater saphenous, and deep femoral veins are patent and free of thrombus. The veins are normally compressible and have normal phasic flow and augmentation response.    Right calf veins: The visualized calf veins are patent.    Contralateral CFV: The contralateral (left) common femoral vein is patent and free of thrombus.    Miscellaneous: None    Soft tissue exam of the  Achilles tendon.  Normal fibers of the Achilles tendon without evidence of delamination or disruption                                       US Lower Extremity Veins Right (Final result)  Result time 09/15/24 18:18:32      Final result by Woodrow Beltran MD (09/15/24 18:18:32)                   Impression:      No evidence of deep venous thrombosis in the right lower extremity.    Negative ultrasound of the right Achilles tendon for disruption or delamination.  If further imaging is clinically warranted, consider MRI of the ankle.      Electronically signed by: Woodrow Beltran  Date:    09/15/2024  Time:    18:18               Narrative:    EXAMINATION:  US LOWER EXTREMITY VEINS RIGHT; US SOFT TISSUE, LOWER EXTREMITY, RIGHT    CLINICAL HISTORY:  possible achilles tear/rupture; Pain in unspecified lower leg    TECHNIQUE:  Duplex and color flow Doppler evaluation and graded compression of the right lower extremity veins was performed.    Additional imaging of the area of concern in the soft tissues of the Achilles tendon were performed.  Comparison imaging of the left side was scanned for comparison.    COMPARISON:  None    FINDINGS:  Right thigh veins: The common femoral, femoral, popliteal, upper greater saphenous, and deep femoral veins are patent and free of thrombus. The veins are normally compressible and have normal phasic flow and augmentation response.    Right calf veins: The visualized calf veins are patent.    Contralateral CFV: The contralateral (left) common femoral vein is patent and free of thrombus.    Miscellaneous: None    Soft tissue exam of the Achilles tendon.  Normal fibers of the Achilles tendon without evidence of delamination or disruption                                       Medications   clonazePAM tablet 0.5 mg (0.5 mg Oral Given 9/15/24 1703)   ondansetron disintegrating tablet 4 mg (4 mg Oral Given 9/15/24 1703)   ibuprofen tablet 600 mg (600 mg Oral Given 9/15/24 1706)     Medical  Decision Making  Emergent evaluation of 28-year-old female who presents with complaint of sudden onset left calf pain when she has stretched over.  Vital signs reveal hypertension, afebrile.  On exam she has tenderness of the calf, no definite evidence for Achilles rupture.  Ultrasound shows no evidence of thrombosis, no evidence of Achilles disruption.  She was treated with analgesics, encouraged close follow-up with Orthopedics, discharged with strict return precautions.    Amount and/or Complexity of Data Reviewed  Labs: ordered.  Radiology: ordered.    Risk  Prescription drug management.                          Medical Decision Making:   Differential Diagnosis:   Includes but not limited to muscle sprain or strain, Achilles tendon rupture disruption, DVT, infection, allergic reaction             Clinical Impression:  Final diagnoses:  [M79.669] Calf pain (Primary)  [M79.662] Pain of left calf          ED Disposition Condition    Discharge Stable          ED Prescriptions       Medication Sig Dispense Start Date End Date Auth. Provider    ibuprofen (ADVIL,MOTRIN) 600 MG tablet Take 1 tablet (600 mg total) by mouth every 6 (six) hours as needed for Pain. 20 tablet 9/15/2024 -- Mari Sr MD    HYDROcodone-acetaminophen (NORCO) 5-325 mg per tablet Take 1 tablet by mouth every 4 (four) hours as needed for Pain. 12 tablet 9/15/2024 9/25/2024 Mari Sr MD          Follow-up Information       Follow up With Specialties Details Why Contact Info    Your regular primary care doctor  Schedule an appointment as soon as possible for a visit  For symptom recheck and close follow-up     Specialists, [facility] In 1 week  [address] [address]  [phone]      [facility] Surgical Oncology, Orthopedic Surgery, Genetics, Physical Medicine and Rehabilitation, Occupational Therapy, Radiology  to see orthopedics [address]  [place] [address]  [phone]               Mari Sr MD  09/19/24 0248

## 2024-09-17 ENCOUNTER — TELEPHONE (OUTPATIENT)
Facility: CLINIC | Age: 29
End: 2024-09-17
Payer: MEDICAID

## 2024-09-17 NOTE — TELEPHONE ENCOUNTER
----- Message from Tanna Smith MA sent at 9/16/2024  3:42 PM CDT -----  Contact: Pt [phone]    ----- Message -----  From: Sue Milton  Sent: 9/16/2024   1:43 PM CDT  To: [facility] Staff    Caller is requesting an earlier appointment then we can schedule.  Caller is requesting a message be sent to the provider.    If this is for urgent care symptoms, did you offer other providers at this location, providers at other locations, or [facility]? (yes, no, n/a):  yes    If this is for the patients physical, did you offer to schedule next available and put on wait list, or to see NP or PA for their physical?  (yes, no, n/a): no     When is the next available appointment with their provider: 10/8/24     Reason for the appointment:  out of anxiety meds    Patient preference of timeframe to be scheduled:  as soon as possible    Would the patient like a call back, or a response through their [facility] portal?:   Call back    Comments: Patient is completely out of her medications and wants to be seen as soon as possible.

## 2024-10-14 ENCOUNTER — OFFICE VISIT (OUTPATIENT)
Facility: CLINIC | Age: 29
End: 2024-10-14
Payer: MEDICAID

## 2024-10-14 VITALS
RESPIRATION RATE: 18 BRPM | OXYGEN SATURATION: 96 % | SYSTOLIC BLOOD PRESSURE: 128 MMHG | HEART RATE: 112 BPM | BODY MASS INDEX: 44.55 KG/M2 | TEMPERATURE: 98 F | DIASTOLIC BLOOD PRESSURE: 80 MMHG | HEIGHT: 62 IN | WEIGHT: 242.06 LBS

## 2024-10-14 DIAGNOSIS — F41.1 GENERALIZED ANXIETY DISORDER: ICD-10-CM

## 2024-10-14 DIAGNOSIS — M54.2 NECK PAIN: Primary | ICD-10-CM

## 2024-10-14 PROCEDURE — 99214 OFFICE O/P EST MOD 30 MIN: CPT | Mod: S$PBB,,,

## 2024-10-14 PROCEDURE — 1159F MED LIST DOCD IN RCRD: CPT | Mod: CPTII,,,

## 2024-10-14 PROCEDURE — 3044F HG A1C LEVEL LT 7.0%: CPT | Mod: CPTII,,,

## 2024-10-14 PROCEDURE — 3074F SYST BP LT 130 MM HG: CPT | Mod: CPTII,,,

## 2024-10-14 PROCEDURE — 99999 PR PBB SHADOW E&M-EST. PATIENT-LVL V: CPT | Mod: PBBFAC,,,

## 2024-10-14 PROCEDURE — 99215 OFFICE O/P EST HI 40 MIN: CPT | Mod: PBBFAC,PN

## 2024-10-14 PROCEDURE — 3079F DIAST BP 80-89 MM HG: CPT | Mod: CPTII,,,

## 2024-10-14 PROCEDURE — 3008F BODY MASS INDEX DOCD: CPT | Mod: CPTII,,,

## 2024-10-14 RX ORDER — METHOCARBAMOL 500 MG/1
500 TABLET, FILM COATED ORAL 3 TIMES DAILY PRN
Qty: 25 TABLET | Refills: 0 | Status: SHIPPED | OUTPATIENT
Start: 2024-10-14

## 2024-10-14 RX ORDER — CLONAZEPAM 0.5 MG/1
0.5 TABLET ORAL DAILY PRN
COMMUNITY
Start: 2024-07-29

## 2024-10-14 RX ORDER — HYDROXYZINE HYDROCHLORIDE 25 MG/1
25 TABLET, FILM COATED ORAL 3 TIMES DAILY PRN
Qty: 25 TABLET | Refills: 0 | Status: SHIPPED | OUTPATIENT
Start: 2024-10-14

## 2024-10-14 NOTE — PROGRESS NOTES
SUBJECTIVE     Chief Complaint   Patient presents with    Medication Refill     Pt states she is here for a med refill       HPI  Marlin Jamison is a 28 y.o. female with multiple medical diagnoses as listed in the medical history and problem list that presents for follow-up neck pain.     Pt states she has been doing pretty good but states he neck pain does not seen to be improving much. Pt would like and needs a referral to PT.       PAST MEDICAL HISTORY:  Past Medical History:   Diagnosis Date    Anxiety     Bipolar disorder, unspecified     Depression     PTSD (post-traumatic stress disorder)     STD (sexually transmitted disease)        PAST SURGICAL HISTORY:  Past Surgical History:   Procedure Laterality Date    ABDOMINAL SURGERY  1/01/2005    APPENDECTOMY      HYSTEROSCOPY N/A 05/14/2024    Procedure: HYSTEROSCOPY;  Surgeon: Patricia Mancini MD;  Location: [facility] OR;  Service: OB/GYN;  Laterality: N/A;  with IUD removal    REMOVAL OF INTRAUTERINE DEVICE (IUD) N/A 05/14/2024    Procedure: REMOVAL, INTRAUTERINE DEVICE;  Surgeon: Patricia Mancini MD;  Location: [facility] OR;  Service: OB/GYN;  Laterality: N/A;    TONSILLECTOMY  2002       SOCIAL HISTORY:  Social History     Socioeconomic History    Marital status: Single   Tobacco Use    Smoking status: Every Day     Current packs/day: 0.50     Average packs/day: 0.5 packs/day for 10.0 years (5.0 ttl pk-yrs)     Types: Cigarettes     Passive exposure: Current    Smokeless tobacco: Never   Substance and Sexual Activity    Alcohol use: Yes     Alcohol/week: 8.0 standard drinks of alcohol     Types: 4 Glasses of wine, 4 Shots of liquor per week     Comment: social    Drug use: Not Currently     Types: Marijuana     Comment: social    Sexual activity: Yes     Partners: Female, Male     Birth control/protection: I.U.D.     Social Drivers of Health     Financial Resource Strain: Medium Risk (2/27/2024)    Overall Financial Resource Strain (CARDIA)     Difficulty of Paying  Living Expenses: Somewhat hard   Food Insecurity: Food Insecurity Present (2/27/2024)    Hunger Vital Sign     Worried About Running Out of Food in the Last Year: Never true     Ran Out of Food in the Last Year: Sometimes true   Transportation Needs: Unmet Transportation Needs (2/27/2024)    PRAPARE - Transportation     Lack of Transportation (Medical): Yes     Lack of Transportation (Non-Medical): Yes   Physical Activity: Insufficiently Active (2/27/2024)    Exercise Vital Sign     Days of Exercise per Week: 1 day     Minutes of Exercise per Session: 60 min   Stress: Stress Concern Present (2/27/2024)    [employer] - Occupational Stress Questionnaire     Feeling of Stress : Very much   Housing Stability: High Risk (2/27/2024)    Housing Stability Vital Sign     Unable to Pay for Housing in the Last Year: Yes     Number of Places Lived in the Last Year: 1     Unstable Housing in the Last Year: No       FAMILY HISTORY:  Family History   Problem Relation Name Age of Onset    Diabetes Mother Shanel     Depression Mother Shanel     Kidney disease Mother Shanel     Mental illness Mother Shanel     No Known Problems Father      No Known Problems Daughter      No Known Problems Son      No Known Problems Maternal Aunt      No Known Problems Maternal Uncle      No Known Problems Paternal Aunt      No Known Problems Paternal Uncle      No Known Problems Maternal Grandmother      No Known Problems Maternal Grandfather      No Known Problems Paternal Grandmother      No Known Problems Paternal Grandfather      No Known Problems Maternal Cousin      No Known Problems Paternal Cousin      No Known Problems Other         ALLERGIES AND MEDICATIONS: updated and reviewed.  Review of patient's allergies indicates:  No Known Allergies  Current Outpatient Medications   Medication Sig Dispense Refill    clonazePAM (KLONOPIN) 0.5 MG tablet Take 0.5 mg by mouth daily as needed.      doxycycline (VIBRA-TABS)  100 MG tablet Take 1 tablet (100 mg total) by mouth 2 (two) times daily. 14 tablet 0    fluconazole (DIFLUCAN) 200 MG Tab Take 1 tablet (200 mg total) by mouth once daily. Take 1 tablet by mouth, if symptoms persist, take second tablet in three days 2 tablet 0    FLUoxetine 20 MG capsule       lamoTRIgine 200 mg TbDL       omeprazole (PRILOSEC) 40 MG capsule Take 1 capsule (40 mg total) by mouth once daily. 30 capsule 11    triamcinolone acetonide 0.1% (KENALOG) 0.1 % cream APPLY  CREAM EXTERNALLY TWICE DAILY 80 g 0    boric acid (BORIC ACID) vaginal suppository Use one suppository vaginal every night for 2 weeks then do twice a week. 30 suppository 2    hydrOXYzine HCL (ATARAX) 25 MG tablet Take 1 tablet (25 mg total) by mouth 3 (three) times daily as needed for Anxiety. 25 tablet 0    ibuprofen (ADVIL,MOTRIN) 600 MG tablet Take 1 tablet (600 mg total) by mouth every 6 (six) hours as needed for Pain. 20 tablet 0    methocarbamoL (ROBAXIN) 500 MG Tab Take 1 tablet (500 mg total) by mouth 3 (three) times daily as needed (spasms). 25 tablet 0    methylphenidate HCl (METADATE CD) 20 MG CR capsule Take 1 capsule (20 mg total) by mouth every morning. 30 capsule 0    naproxen (NAPROSYN) 500 MG tablet Take 1 tablet (500 mg total) by mouth 2 (two) times daily with meals. DO NOT TAKE WITH OTHER NSAIDS (ibuprofen, aspirin, etc) 30 tablet 1     No current facility-administered medications for this visit.       ROS  Review of Systems   Constitutional:  Negative for appetite change, chills, diaphoresis, fatigue, fever and unexpected weight change.   HENT:  Negative for congestion, ear pain, postnasal drip, rhinorrhea, sinus pressure, sinus pain and sore throat.    Eyes:  Negative for pain and redness.   Respiratory:  Negative for cough, choking, chest tightness and shortness of breath.    Cardiovascular:  Negative for chest pain, palpitations and leg swelling.   Gastrointestinal:  Negative for abdominal pain, constipation,  "diarrhea, nausea and vomiting.   Endocrine: Negative for cold intolerance and heat intolerance.   Genitourinary:  Negative for difficulty urinating, dysuria, flank pain and urgency.   Musculoskeletal:  Positive for neck pain. Negative for back pain and neck stiffness.   Skin:  Negative for color change, pallor and rash.   Allergic/Immunologic: Negative for food allergies.   Neurological:  Negative for dizziness, tremors, weakness, light-headedness and headaches.   Psychiatric/Behavioral:  Negative for agitation, confusion, dysphoric mood, hallucinations, self-injury, sleep disturbance and suicidal ideas. The patient is nervous/anxious. The patient is not hyperactive.        OBJECTIVE     Physical Exam  Vitals:    10/14/24 1434   BP: 128/80   Pulse: (!) 112   Resp: 18   Temp: 97.6 °F (36.4 °C)    Body mass index is 44.27 kg/m².  Weight: 109.8 kg (242 lb 1 oz)   Height: 5' 2" (157.5 cm)     Physical Exam  Constitutional:       Appearance: Normal appearance.   HENT:      Right Ear: Tympanic membrane normal. There is no impacted cerumen.      Left Ear: Tympanic membrane normal. There is no impacted cerumen.      Nose: Nose normal. No congestion or rhinorrhea.      Mouth/Throat:      Mouth: Mucous membranes are moist.      Pharynx: Oropharynx is clear.   Eyes:      General:         Right eye: No discharge.         Left eye: No discharge.      Conjunctiva/sclera: Conjunctivae normal.   Cardiovascular:      Rate and Rhythm: Normal rate and regular rhythm.      Pulses: Normal pulses.      Heart sounds: Normal heart sounds.   Pulmonary:      Effort: Pulmonary effort is normal. No respiratory distress.      Breath sounds: Normal breath sounds. No wheezing.   Chest:      Chest wall: No tenderness.   Abdominal:      General: Bowel sounds are normal. There is no distension.      Palpations: Abdomen is soft.      Tenderness: There is no abdominal tenderness.   Musculoskeletal:         General: No swelling, tenderness or signs of " injury. Normal range of motion.      Cervical back: Normal range of motion. No rigidity or tenderness.      Right lower leg: No edema.      Left lower leg: No edema.   Skin:     General: Skin is warm and dry.      Coloration: Skin is not pale.      Findings: No erythema, lesion or rash.   Neurological:      Mental Status: She is alert and oriented to person, place, and time.   Psychiatric:         Mood and Affect: Mood normal.         Behavior: Behavior normal.         Health Maintenance         Date Due Completion Date    Pneumococcal Vaccines (Age 0-64) (1 of 2 - PCV) Never done ---    Influenza Vaccine (1) 09/01/2024 11/19/2021    COVID-19 Vaccine (1 - 2024-25 season) Never done ---    Cervical Cancer Screening 03/12/2029 3/12/2024    TETANUS VACCINE 07/17/2032 7/17/2022    RSV Vaccine (Age 60+ and Pregnant patients) (1 - 1-dose 75+ series) 11/02/2068 ---              ASSESSMENT     28 y.o. female with     1. Neck pain    2. Generalized anxiety disorder        PLAN:     1. Neck pain  Uncontrolled; Treating   - discussed with patient current symptoms and progression   - instructed patient to began Rx therapy as directed   - informed patient of referral placed to PT to help with focused treatment and further recommendation based on assessment and treatments  - Ambulatory referral/consult to Physical/Occupational Therapy; Future  - methocarbamoL (ROBAXIN) 500 MG Tab; Take 1 tablet (500 mg total) by mouth 3 (three) times daily as needed (spasms).  Dispense: 25 tablet; Refill: 0    2. Generalized anxiety disorder  Treating   Encouraged the patient to perform self-calming techniques, such as deep breathing/relaxation techniques and exercise.  - instructed patient on use of prescribed therapy   - pt to start Rx therapy as directed as needed for acute anxiety attacks  - follow up if symptoms worsen or fail to improve with treatments   - hydrOXYzine HCL (ATARAX) 25 MG tablet; Take 1 tablet (25 mg total) by mouth 3 (three)  times daily as needed for Anxiety.  Dispense: 25 tablet; Refill: 0    RTC in 2 months or sooner if needed    Thang Bean DNP, APRN, FNP-BC  [facility]  10/23/2024 2:45 PM

## 2024-10-14 NOTE — PROGRESS NOTES
"SUBJECTIVE     Chief Complaint   Patient presents with    Medication Refill     Pt states she is here for a med refill       HPI  Marlin Jamison is a 28 y.o. female with multiple medical diagnoses as listed in the medical history and problem list that presents for {Blank single:02125::"evaluation","follow-up","annual exam","establishment of care"}***    PAST MEDICAL HISTORY:  Past Medical History:   Diagnosis Date    Anxiety     Bipolar disorder, unspecified     Depression     PTSD (post-traumatic stress disorder)     STD (sexually transmitted disease)        PAST SURGICAL HISTORY:  Past Surgical History:   Procedure Laterality Date    ABDOMINAL SURGERY  1/01/2005    APPENDECTOMY      HYSTEROSCOPY N/A 05/14/2024    Procedure: HYSTEROSCOPY;  Surgeon: Patricia Mancini MD;  Location: [facility] OR;  Service: OB/GYN;  Laterality: N/A;  with IUD removal    REMOVAL OF INTRAUTERINE DEVICE (IUD) N/A 05/14/2024    Procedure: REMOVAL, INTRAUTERINE DEVICE;  Surgeon: Patricia Mancini MD;  Location: [facility] OR;  Service: OB/GYN;  Laterality: N/A;    TONSILLECTOMY  2002       SOCIAL HISTORY:  Social History     Socioeconomic History    Marital status: Single   Tobacco Use    Smoking status: Every Day     Current packs/day: 0.50     Average packs/day: 0.5 packs/day for 10.0 years (5.0 ttl pk-yrs)     Types: Cigarettes     Passive exposure: Current    Smokeless tobacco: Never   Substance and Sexual Activity    Alcohol use: Yes     Alcohol/week: 8.0 standard drinks of alcohol     Types: 4 Glasses of wine, 4 Shots of liquor per week     Comment: social    Drug use: Not Currently     Types: Marijuana     Comment: social    Sexual activity: Yes     Partners: Female, Male     Birth control/protection: I.U.D.     Social Drivers of Health     Financial Resource Strain: Medium Risk (2/27/2024)    Overall Financial Resource Strain (CARDIA)     Difficulty of Paying Living Expenses: Somewhat hard   Food Insecurity: Food Insecurity Present (2/27/2024) "    Hunger Vital Sign     Worried About Running Out of Food in the Last Year: Never true     Ran Out of Food in the Last Year: Sometimes true   Transportation Needs: Unmet Transportation Needs (2/27/2024)    PRAPARE - Transportation     Lack of Transportation (Medical): Yes     Lack of Transportation (Non-Medical): Yes   Physical Activity: Insufficiently Active (2/27/2024)    Exercise Vital Sign     Days of Exercise per Week: 1 day     Minutes of Exercise per Session: 60 min   Stress: Stress Concern Present (2/27/2024)    [employer] [place] [employer] - Occupational Stress Questionnaire     Feeling of Stress : Very much   Housing Stability: High Risk (2/27/2024)    Housing Stability Vital Sign     Unable to Pay for Housing in the Last Year: Yes     Number of Places Lived in the Last Year: 1     Unstable Housing in the Last Year: No       FAMILY HISTORY:  Family History   Problem Relation Name Age of Onset    Diabetes Mother Shanel     Depression Mother Shanel     Kidney disease Mother Shanel     Mental illness Mother Shanel     No Known Problems Father      No Known Problems Daughter      No Known Problems Son      No Known Problems Maternal Aunt      No Known Problems Maternal Uncle      No Known Problems Paternal Aunt      No Known Problems Paternal Uncle      No Known Problems Maternal Grandmother      No Known Problems Maternal Grandfather      No Known Problems Paternal Grandmother      No Known Problems Paternal Grandfather      No Known Problems Maternal Cousin      No Known Problems Paternal Cousin      No Known Problems Other         ALLERGIES AND MEDICATIONS: updated and reviewed.  Review of patient's allergies indicates:  No Known Allergies  Current Outpatient Medications   Medication Sig Dispense Refill    clonazePAM (KLONOPIN) 0.5 MG tablet Take 0.5 mg by mouth daily as needed.      doxycycline (VIBRA-TABS) 100 MG tablet Take 1 tablet (100 mg total) by mouth 2 (two) times daily. 14 tablet 0     "fluconazole (DIFLUCAN) 200 MG Tab Take 1 tablet (200 mg total) by mouth once daily. Take 1 tablet by mouth, if symptoms persist, take second tablet in three days 2 tablet 0    FLUoxetine 20 MG capsule       lamoTRIgine 200 mg TbDL       omeprazole (PRILOSEC) 40 MG capsule Take 1 capsule (40 mg total) by mouth once daily. 30 capsule 11    triamcinolone acetonide 0.1% (KENALOG) 0.1 % cream APPLY  CREAM EXTERNALLY TWICE DAILY 80 g 0    boric acid (BORIC ACID) vaginal suppository Use one suppository vaginal every night for 2 weeks then do twice a week. 30 suppository 2    hydrOXYzine HCL (ATARAX) 25 MG tablet Take 1 tablet (25 mg total) by mouth 3 (three) times daily as needed for Anxiety. 25 tablet 0    ibuprofen (ADVIL,MOTRIN) 600 MG tablet Take 1 tablet (600 mg total) by mouth every 6 (six) hours as needed for Pain. 20 tablet 0    methocarbamoL (ROBAXIN) 500 MG Tab Take 1 tablet (500 mg total) by mouth 3 (three) times daily as needed (spasms). 25 tablet 0    methylphenidate HCl (METADATE CD) 20 MG CR capsule Take 1 capsule (20 mg total) by mouth every morning. 30 capsule 0    naproxen (NAPROSYN) 500 MG tablet Take 1 tablet (500 mg total) by mouth 2 (two) times daily with meals. DO NOT TAKE WITH OTHER NSAIDS (ibuprofen, aspirin, etc) 30 tablet 1     No current facility-administered medications for this visit.       ROS  Review of Systems  ***    OBJECTIVE     Physical Exam  Vitals:    10/14/24 1434   BP: 128/80   Pulse: (!) 112   Resp: 18   Temp: 97.6 °F (36.4 °C)    Body mass index is 44.27 kg/m².  Weight: 109.8 kg (242 lb 1 oz)   Height: 5' 2" (157.5 cm)     Physical Exam  ***    Health Maintenance         Date Due Completion Date    Pneumococcal Vaccines (Age 0-64) (1 of 2 - PCV) Never done ---    Influenza Vaccine (1) 09/01/2024 11/19/2021    COVID-19 Vaccine (1 - 2024-25 season) Never done ---    Cervical Cancer Screening 03/12/2029 3/12/2024    TETANUS VACCINE 07/17/2032 7/17/2022    RSV Vaccine (Age 60+ and " Pregnant patients) (1 - 1-dose 75+ series) 11/02/2068 ---              ASSESSMENT     28 y.o. female with     1. Neck pain    2. Generalized anxiety disorder        PLAN:     1. Neck pain  Uncontrolled; Treating   - discussed with patient onset, progression, and current symptoms   - explained to patient plan of care and initiation of Rx therapy today along with referral to specialist   - instructed patient to take Rx therapy as directed as needed for spasms  - follow up after patient is seen and had therapy treatments .   - Ambulatory referral/consult to Physical/Occupational Therapy; Future  - methocarbamoL (ROBAXIN) 500 MG Tab; Take 1 tablet (500 mg total) by mouth 3 (three) times daily as needed (spasms).  Dispense: 25 tablet; Refill: 0    2. Generalized anxiety disorder  - Encouraged the patient to perform self-calming techniques, such as deep breathing/relaxation techniques and exercise.  - provided patient with education and explanation of Rx therapy and how to take  - instructed patient to began Rx therapy as directed as needed  - hydrOXYzine HCL (ATARAX) 25 MG tablet; Take 1 tablet (25 mg total) by mouth 3 (three) times daily as needed for Anxiety.  Dispense: 25 tablet; Refill: 0      RTC in 2 months    Thang Bean DNP, APRN, FNP-BC  [facility]  10/23/2024 2:41 PM

## 2024-10-21 NOTE — PROGRESS NOTES
[facility]  Physical Therapy Initial Evaluation    Name: Marlin Jamison  Clinic Number: [id number]    Therapy Diagnosis:   Encounter Diagnoses   Name Primary?    Neck pain     Decreased range of motion of intervertebral discs of cervical spine Yes    Muscle weakness      Physician: Thang Bean DNP    Physician Orders: PT Eval and Treat   Medical Diagnosis: M54.2 (ICD-10-CM) - Neck pain   Evaluation Date: 10/22/2024  Authorization Period Expiration: 10/14/2024  Plan of Care Certification Period: 12/17/2024  Visit # / Visits authorized: 1/ 1    Time In: 1300  Time Out: 1345  Total Billable Time separate from evaluation time: 15 minutes    Precautions: Standard      Subjective   Date of onset: July 2024  History of current condition - Marlin reports: falling off a bench in July 2024 and hit the right side of her head. Experienced neck stiffness, tension, and aching with movement, as well as headaches. States she has always had a spot in her back that hurts her, would have her boyfriend massage it. Can have tingling and numbness in to the right arm, pain can shot down the arm. States when she has the numbness, can have difficulty picking up things        Past Medical History:   Diagnosis Date    Anxiety     Bipolar disorder, unspecified     Depression     PTSD (post-traumatic stress disorder)     STD (sexually transmitted disease)      Marlin Jamison  has a past surgical history that includes Appendectomy; Tonsillectomy (2002); Abdominal surgery (1/01/2005); Hysteroscopy (N/A, 05/14/2024); and Removal of intrauterine device (IUD) (N/A, 05/14/2024).    Marlin has a current medication list which includes the following prescription(s): boric acid, clonazepam, doxycycline, fluconazole, fluoxetine, hydroxyzine hcl, ibuprofen, lamotrigine, methocarbamol, methylphenidate hcl, naproxen, omeprazole, and triamcinolone acetonide 0.1%.    Review of patient's allergies indicates:  No Known  Allergies     Falls: 2024, fell off bench    Imagin2024  X-ray  FINDINGS:  There is straightening of the normal cervical lordosis.  The vertebral body heights are satisfactorily preserved.  There is loss of disc space height with degenerative endplate change identified at C5-6 and C6-7.  C7 is incompletely visualized on the lateral radiograph.  There is no instability upon flexion extension.  There is no fracture, dislocation, or bony erosion.    Prior Therapy: none  Social History:  lives with boyfriend  Occupation: unemployed  Prior Level of Function: Independent with ambulation and activities of daily living   Current Level of Function: Independent with ambulation and activities of daily living, pain with turning head to check blind spot when driving    Pain:  Current 2/10, worst 6/10, best 1/10   Location: bilateral back  and neck , right > left   Description: Dull, Tight, and Sharp  Aggravating Factors: turning her head, morning time.  Easing Factors: massage, lidocaine patches, massage from boyfriend, reposition    Radicular symptoms: tingling and pain into right upper extremity     Sleep is occasionally disturbed. Sleeping position: side, stomach    Pts goals: Get the back pain sorted out. Get rid of the numbness, prevent it from getting worse.    Objective     Postural examination in sitting:   - decreased  lumbar lordosis  - forward head  - forward shoulders  - protracted scapulae      Functional assessment:   - walking: Independent   - sit to stand: Independent   - sit to supine:  Independent   - supine to sit: Independent   - supine to prone: Independent     Cervical AROM    Flexion (60) 40 degrees   Extension (70) 60 degrees   Right rotation (80) 60 degrees   Left rotation (80) 55 degrees   Right side bending (45) 35 degrees   Left side bending (45) 37 degrees       Cervical Special Tests    Compression negative   Distraction positive   Alar Ligament Stress Test: negative   Sharp-Kaur Test  "negative   Spurling's:    negative       UE MMT R L   C3 Cervical side bend 5/5 5/5   C4 Shoulder Shrug 5/5  5/5   Shoulder flexion 5/5 5/5   Shoulder abduction 5/5 5/5   Shoulder internal rotation  5/5 5/5   Shoulder external rotation  5/5 5/5   C5 Elbow flexion 5/5 5/5   C7 Elbow extension 5/5 5/5   C6 Wrist extension 5/5 5/5   Wrist flexion 5/5 5/5   Scapular protraction 5/5 5/5   Mid trapezius  4-/5 4-/5   Lower trapezius  3+/5 3+/5     Palpation: tender to palpation right levator scapulae and along medial border of right scapula    Intake Outcome Measure for FOTO Neck Survey    Therapist reviewed FOTO scores for Marlin Jamison on 10/22/2024.   FOTO report - see Media section or FOTO account episode details.    Intake Score: 62%         TREATMENT   Treatment Time In: 1325  Treatment Time Out: 1340  Total Treatment time separate from Evaluation time: 15 minutes    Marlin received therapeutic exercises to develop strength, endurance, ROM, flexibility, and posture for 15 minutes including:  Self massage lacrosse ball  Prone scapular retractions 20 x 5"  Prone Ys x 10 repetitions   Prone Ws x 10 repetitions   Prone Ts x 10 repetitions   Prone Is x 10 repetitions     Home Exercises Provided and Patient Education Provided     Education provided:     Discussed the role of the PTA on the Rehab Team. Discussed patient will be seen by a physical therapist minimally every 6th visit or every 30 days prior to being seen by PTA. Also discussed the use of the [facility] for communication.     Self massage lacrosse ball  Prone scapular retractions  Prone Ys   Prone Ws  Prone Ts  Prone Is    Written Home Exercises Provided: yes.  Exercises were reviewed and Marlin was able to demonstrate them prior to the end of the session.  Marlin demonstrated good  understanding of the education provided.     See Electronic Medical Record under Patient Instructions for exercises provided 10/22/2024.    Assessment   Marlin is a " 28 y.o. female referred to outpatient Physical Therapy with a medical diagnosis of  M54.2 (ICD-10-CM) - Neck pain. Patient presents with decreased cervical range of motion, postural imbalance, periscapular weakness contributing to neck and thoracic pain. Patient has had long standing pain in right shoulder blade complex. Patient will benefit from outpatient physical therapy for postural reeducation, manual therapy, and periscapular strengthening to progress to below listed goals. Patient i a candidate for dry needling. Patient has increased tension/tone in right upper traps, right levator and in right rhomboids and mid trapezius.     Patient prognosis is Excellent.   Patient will benefit from skilled outpatient Physical Therapy to address the deficits stated above and in the chart below, provide pt/family education, and to maximize pt's level of independence.     Plan of care discussed with patient: Yes  Patient's spiritual, cultural and educational needs considered and patient is agreeable to the plan of care and goals as stated below:     Anticipated Barriers for therapy: none    Medical Necessity is demonstrated by the following      Medical Necessity is demonstrated by the following  History  Co-morbidities and personal factors that may impact the plan of care [] LOW: no personal factors / co-morbidities  [] MODERATE: 1-2 personal factors / co-morbidities  [x] HIGH: 3+ personal factors / co-morbidities    Moderate / High Support Documentation:   Co-morbidities affecting plan of care: anxiety, Bipolar Disorder, Post Traumatic Stress Disorder, depression    Personal Factors:   no deficits     Examination  Body Structures and Functions, activity limitations and participation restrictions that may impact the plan of care [] LOW: addressing 1-2 elements  [x] MODERATE: 3+ elements  [] HIGH: 4+ elements (please support below)    Moderate / High Support Documentation: decreased cervical range of motion, postural  imbalance, muscle weakness     Clinical Presentation [] LOW: stable  [x] MODERATE: Evolving  [] HIGH: Unstable     Decision Making/ Complexity Score: moderate           Goals:  Short Term Goals: 4 weeks   Independent with HEP.  Report decreased cervical and thoracic back pain < or =  4/10 with activities of daily living such as turning her head, morning time.  Increased manual muscle test for bilateral upper extremities by 1/2 muscle grade to promote proper scapular stabilization to decrease cervical and thoracic back pain < or =  4/10 with activities of daily living such as turning her head, morning time.    Long Term Goals: 8 weeks   Increase bilateral cervical rotation to 65 degrees.  Report decreased cervical and thoracic back pain < or =  2/10 with activities of daily living such as turning her head, morning time.  Increased manual muscle test  for bilateral upper extremities by 1 muscle grade to promote proper scapular stabilization to decrease cervical and thoracic back pain < or =  2/10 with activities of daily living such as turning her head, morning time.      Plan   Certification Period/Plan of care expiration: 10/22/2024 to 12/17/2024.    Outpatient Physical Therapy 2 times weekly for 8 weeks to include the following interventions: Manual Therapy, Moist Heat/ Ice, Neuromuscular Re-ed, Patient Education, Therapeutic Activities, Therapeutic Exercise, and Dry Needling .     Lopez Campbell, PT

## 2024-10-22 ENCOUNTER — CLINICAL SUPPORT (OUTPATIENT)
Dept: REHABILITATION | Facility: OTHER | Age: 29
End: 2024-10-22
Payer: MEDICAID

## 2024-10-22 DIAGNOSIS — M54.2 NECK PAIN: ICD-10-CM

## 2024-10-22 DIAGNOSIS — M53.82 DECREASED RANGE OF MOTION OF INTERVERTEBRAL DISCS OF CERVICAL SPINE: Primary | ICD-10-CM

## 2024-10-22 DIAGNOSIS — M62.81 MUSCLE WEAKNESS: ICD-10-CM

## 2024-10-22 PROCEDURE — 97162 PT EVAL MOD COMPLEX 30 MIN: CPT | Mod: PN

## 2024-10-22 PROCEDURE — 97110 THERAPEUTIC EXERCISES: CPT | Mod: PN

## 2024-10-22 NOTE — PATIENT INSTRUCTIONS
SELF MASSAGE DOUBLE BALL (PEANUT) - MID BACK - MID SCAPULAR - MIDDLE TRAPS        Place a double lacrosse ball or 2 tennis/racquetballs taped up together between a wall and between your shoulder blades.    Lean into the ball to apply gentle pressure to the tight areas. Do not roll over bony areas.    You can hold on one area or bend and straighten your knees to roll the double ball up/down your spine.     Copyright © 2024 [employer]    PRONE RETRACTION        Lying face down with your arms by your side, slowly squeeze your shoulder blades downward and towards your spine.     Hold for  3  Seconds. Perform  20  reps    Copyright © 2024 [employer]    PRONE RETRACTION EXTENSION - PRONE I          Lying face down with your arms by your side, slowly move your arms upward towards the ceiling as you squeeze your shoulder blades downwards and towards your spine.     Hold for  3  Seconds. Perform 2 sets of  10  reps    Copyright © 2024 [employer]    PRONE W        Lying face down with your elbows bent and palms facing downward, slowly raise your arms up towards the ceiling as you squeeze your shoulder blades downward and towards your spine.     Hold for  3  Seconds. Perform 2 sets of  10  reps    Copyright © 2024 [employer]      PRONE Y        Lying face down with your arms stretched out upwards as shown, slowly move your arms upward towards the ceiling as you squeeze your shoulder blades downward and towards your spine.     Hold for  3  Seconds. Perform 2 sets of  10  reps    Copyright © 2024 [employer]      Prone Ts        Start Position: arms out to your sides (like an airplane wing) Palms facing down.    End position: squeeze your shoulder blades together and raise your arms up     Hold for  3  Seconds. Perform 2 sets of  10  Reps.    Copyright © 2024 [employer]

## 2024-10-24 ENCOUNTER — DOCUMENTATION ONLY (OUTPATIENT)
Dept: REHABILITATION | Facility: OTHER | Age: 29
End: 2024-10-24
Payer: MEDICAID

## 2024-10-24 NOTE — PROGRESS NOTES
Patient failed to appear for scheduled PT appointment today at 9:00 am without prior notification or cancellation.    Nela Gupta, PT, DPT  10/24/2024

## 2024-10-29 ENCOUNTER — CLINICAL SUPPORT (OUTPATIENT)
Dept: REHABILITATION | Facility: OTHER | Age: 29
End: 2024-10-29
Payer: MEDICAID

## 2024-10-29 ENCOUNTER — DOCUMENTATION ONLY (OUTPATIENT)
Dept: REHABILITATION | Facility: OTHER | Age: 29
End: 2024-10-29

## 2024-10-29 DIAGNOSIS — M53.82 DECREASED RANGE OF MOTION OF INTERVERTEBRAL DISCS OF CERVICAL SPINE: Primary | ICD-10-CM

## 2024-10-29 DIAGNOSIS — M62.81 MUSCLE WEAKNESS: ICD-10-CM

## 2024-10-29 PROCEDURE — 97110 THERAPEUTIC EXERCISES: CPT | Mod: PN,CQ

## 2024-10-31 ENCOUNTER — DOCUMENTATION ONLY (OUTPATIENT)
Dept: REHABILITATION | Facility: OTHER | Age: 29
End: 2024-10-31
Payer: MEDICAID

## 2024-11-05 ENCOUNTER — DOCUMENTATION ONLY (OUTPATIENT)
Dept: REHABILITATION | Facility: OTHER | Age: 29
End: 2024-11-05
Payer: MEDICAID

## 2024-11-05 NOTE — PROGRESS NOTES
Patient was scheduled for a physical therapy treatment appointment at [facility] on 11/5/2024. Patient failed to appear for the appointment without prior notification.     Tunde Toledo III, PTA

## 2024-11-06 ENCOUNTER — PATIENT MESSAGE (OUTPATIENT)
Dept: REHABILITATION | Facility: OTHER | Age: 29
End: 2024-11-06
Payer: MEDICAID

## 2024-11-14 ENCOUNTER — OFFICE VISIT (OUTPATIENT)
Facility: CLINIC | Age: 29
End: 2024-11-14
Payer: MEDICAID

## 2024-11-14 VITALS
DIASTOLIC BLOOD PRESSURE: 80 MMHG | RESPIRATION RATE: 18 BRPM | OXYGEN SATURATION: 98 % | SYSTOLIC BLOOD PRESSURE: 126 MMHG | WEIGHT: 245.69 LBS | TEMPERATURE: 98 F | HEART RATE: 97 BPM | HEIGHT: 62 IN | BODY MASS INDEX: 45.21 KG/M2

## 2024-11-14 DIAGNOSIS — F90.9 ATTENTION DEFICIT HYPERACTIVITY DISORDER (ADHD), UNSPECIFIED ADHD TYPE: Primary | ICD-10-CM

## 2024-11-14 DIAGNOSIS — F41.1 GENERALIZED ANXIETY DISORDER: ICD-10-CM

## 2024-11-14 DIAGNOSIS — F31.78 BIPOLAR DISORDER, IN FULL REMISSION, MOST RECENT EPISODE MIXED: ICD-10-CM

## 2024-11-14 DIAGNOSIS — Z23 NEED FOR INFLUENZA VACCINATION: ICD-10-CM

## 2024-11-14 PROCEDURE — 3008F BODY MASS INDEX DOCD: CPT | Mod: CPTII,,, | Performed by: STUDENT IN AN ORGANIZED HEALTH CARE EDUCATION/TRAINING PROGRAM

## 2024-11-14 PROCEDURE — 90471 IMMUNIZATION ADMIN: CPT | Mod: PBBFAC,PN

## 2024-11-14 PROCEDURE — 99999 PR PBB SHADOW E&M-EST. PATIENT-LVL IV: CPT | Mod: PBBFAC,,, | Performed by: STUDENT IN AN ORGANIZED HEALTH CARE EDUCATION/TRAINING PROGRAM

## 2024-11-14 PROCEDURE — 1160F RVW MEDS BY RX/DR IN RCRD: CPT | Mod: CPTII,,, | Performed by: STUDENT IN AN ORGANIZED HEALTH CARE EDUCATION/TRAINING PROGRAM

## 2024-11-14 PROCEDURE — 3074F SYST BP LT 130 MM HG: CPT | Mod: CPTII,,, | Performed by: STUDENT IN AN ORGANIZED HEALTH CARE EDUCATION/TRAINING PROGRAM

## 2024-11-14 PROCEDURE — 99214 OFFICE O/P EST MOD 30 MIN: CPT | Mod: S$PBB,,, | Performed by: STUDENT IN AN ORGANIZED HEALTH CARE EDUCATION/TRAINING PROGRAM

## 2024-11-14 PROCEDURE — 99999PBSHW PR PBB SHADOW TECHNICAL ONLY FILED TO HB: Mod: PBBFAC,,,

## 2024-11-14 PROCEDURE — 3044F HG A1C LEVEL LT 7.0%: CPT | Mod: CPTII,,, | Performed by: STUDENT IN AN ORGANIZED HEALTH CARE EDUCATION/TRAINING PROGRAM

## 2024-11-14 PROCEDURE — 99214 OFFICE O/P EST MOD 30 MIN: CPT | Mod: PBBFAC,PN | Performed by: STUDENT IN AN ORGANIZED HEALTH CARE EDUCATION/TRAINING PROGRAM

## 2024-11-14 PROCEDURE — 3079F DIAST BP 80-89 MM HG: CPT | Mod: CPTII,,, | Performed by: STUDENT IN AN ORGANIZED HEALTH CARE EDUCATION/TRAINING PROGRAM

## 2024-11-14 PROCEDURE — 1159F MED LIST DOCD IN RCRD: CPT | Mod: CPTII,,, | Performed by: STUDENT IN AN ORGANIZED HEALTH CARE EDUCATION/TRAINING PROGRAM

## 2024-11-14 PROCEDURE — 90656 IIV3 VACC NO PRSV 0.5 ML IM: CPT | Mod: PBBFAC,PN

## 2024-11-14 RX ORDER — LISDEXAMFETAMINE DIMESYLATE 20 MG/1
20 CAPSULE ORAL EVERY MORNING
Qty: 30 CAPSULE | Refills: 0 | Status: SHIPPED | OUTPATIENT
Start: 2024-11-14 | End: 2025-11-14

## 2024-11-14 RX ORDER — CLONAZEPAM 0.5 MG/1
0.5 TABLET ORAL DAILY PRN
Qty: 30 TABLET | Refills: 0 | Status: SHIPPED | OUTPATIENT
Start: 2024-11-14 | End: 2025-11-14

## 2024-11-14 RX ORDER — LAMOTRIGINE 300 MG/1
300 TABLET, EXTENDED RELEASE ORAL DAILY
Start: 2024-11-14 | End: 2025-11-14

## 2024-11-14 RX ADMIN — INFLUENZA VIRUS VACCINE 0.5 ML: 15; 15; 15 SUSPENSION INTRAMUSCULAR at 11:11

## 2024-11-14 NOTE — PROGRESS NOTES
SUBJECTIVE     Chief Complaint   Patient presents with    Medication Reaction     Pt stated she is here to follow up on her medication from her neck pain and PT on her neck       History of Present Illness    CHIEF COMPLAINT:  Patient presents for medication review and refills, particularly for Klonopin and to discuss changing her ADHD medication from methylphenidate to potentially Vyvanse.    HPI:  Patient reports a history of anxiety and ADHD. She had been taking Klonopin daily for anxiety and methylphenidate for ADHD. While methylphenidate benefits her ADHD symptoms, it causes excessive stimulation, resulting in anxiety and restlessness. Patient discontinued Klonopin for about a month and noted reduced dependency, especially with lower stress levels while unemployed. She still has occasional panic attacks.    Patient describes fluctuating anxiety levels, with periods of heightened anxiety followed by gradual reduction. She manages her anxiety by allowing herself rest days.    Regarding ADHD medication, the patient discontinued methylphenidate due to prolonged effects into nighttime and irritability. She is interested in trying Vyvanse at a lower dosage, based on her mother's similar experience.    Patient has a bipolar disorder diagnosis, treated with lamotrigine (Lamictal), which she finds most beneficial. Her dosage was increased to 400mg following a summer episode, but she reduced it to 300mg due to memory issues.    Patient had her IUD removed in August due to abdominal pain. The removal required anesthesia but resulted in no complications.    Patient denies any negative side effects from Prozac or intention to become pregnant.    MEDICATIONS:  Patient is on Klonopin daily for anxiety and sleep, which she has been using for 1-2 years. She is also taking Fluoxetine (Prozac) for anxiety and Lamotrigine 300 mg daily for bipolar disorder. Methylphenidate (Concerta) for ADHD has been discontinued.    MEDICAL  HISTORY:  Patient has a history of anxiety, panic disorder, ADHD, and bipolar disorder. Patient previously used an IUD for contraception, which was removed in August 2023. She has been on birth control since age 14. No children are mentioned.    FAMILY HISTORY:  Family history is significant for mother with ADHD, who switched from methylphenidate to Vyvanse.    SURGICAL HISTORY:  Patient underwent IUD removal in May due to abdominal pain. The initial removal attempt was unsuccessful, but it was subsequently removed under anesthesia without complications.    IMAGING:  Patient had a pelvic ultrasound in March related to her IUD removal.    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 She has a partner.      ROS:  General: -fever, -chills, -fatigue, -weight gain, -weight loss  Eyes: -vision changes, -redness, -discharge  ENT: -ear pain, -nasal congestion, -sore throat  Cardiovascular: -chest pain, -palpitations, -lower extremity edema  Respiratory: -cough, -shortness of breath  Gastrointestinal: +abdominal pain, -nausea, -vomiting, -diarrhea, -constipation, -blood in stool  Genitourinary: -dysuria, -hematuria, -frequency  Musculoskeletal: -joint pain, -muscle pain  Skin: -rash, -lesion  Neurological: -headache, -dizziness, -numbness, -tingling  Psychiatric: +anxiety, -depression, -sleep difficulty           PAST MEDICAL HISTORY:  Past Medical History:   Diagnosis Date    Anxiety     Bipolar disorder, unspecified     Depression     PTSD (post-traumatic stress disorder)     STD (sexually transmitted disease)        ALLERGIES AND MEDICATIONS: updated and reviewed.  Review of patient's allergies indicates:  No Known Allergies  Current Outpatient Medications   Medication Sig Dispense Refill    FLUoxetine 20 MG capsule       hydrOXYzine HCL (ATARAX) 25 MG tablet Take 1 tablet (25 mg total) by mouth 3 (three) times daily as needed for Anxiety.  "25 tablet 0    omeprazole (PRILOSEC) 40 MG capsule Take 1 capsule (40 mg total) by mouth once daily. 30 capsule 11    triamcinolone acetonide 0.1% (KENALOG) 0.1 % cream APPLY  CREAM EXTERNALLY TWICE DAILY 80 g 0    clonazePAM (KLONOPIN) 0.5 MG tablet Take 1 tablet (0.5 mg total) by mouth daily as needed (panic attack). 30 tablet 0    lamotrigine XR (LAMICTAL XR) 300 mg XR tablet Take 1 tablet (300 mg total) by mouth once daily.      lisdexamfetamine (VYVANSE) 20 MG capsule Take 1 capsule (20 mg total) by mouth every morning. 30 capsule 0     No current facility-administered medications for this visit.         OBJECTIVE     Physical Exam  Vitals:    11/14/24 1119   BP: 126/80   Pulse: 97   Resp: 18   Temp: 98.2 °F (36.8 °C)    Body mass index is 44.94 kg/m².  Weight: 111.4 kg (245 lb 11.2 oz)   Height: 5' 2" (157.5 cm)     Physical Exam  Vitals reviewed.   Constitutional:       General: She is not in acute distress.  HENT:      Right Ear: External ear normal.      Left Ear: External ear normal.      Nose: Nose normal.      Mouth/Throat:      Mouth: Mucous membranes are moist.   Eyes:      Extraocular Movements: Extraocular movements intact.      Conjunctiva/sclera: Conjunctivae normal.      Pupils: Pupils are equal, round, and reactive to light.   Pulmonary:      Effort: Pulmonary effort is normal.   Abdominal:      General: There is no distension.      Palpations: Abdomen is soft.   Musculoskeletal:         General: No swelling. Normal range of motion.      Cervical back: Normal range of motion.   Skin:     General: Skin is warm and dry.      Findings: No rash.   Neurological:      General: No focal deficit present.      Mental Status: She is alert and oriented to person, place, and time.   Psychiatric:         Mood and Affect: Mood normal.         Behavior: Behavior normal.           Health Maintenance         Date Due Completion Date    Pneumococcal Vaccines (Age 0-64) (1 of 2 - PCV) Never done ---    COVID-19 " Vaccine (1 - 2024-25 season) Never done ---    Cervical Cancer Screening 03/12/2029 3/12/2024    TETANUS VACCINE 07/17/2032 7/17/2022    RSV Vaccine (Age 60+ and Pregnant patients) (1 - 1-dose 75+ series) 11/02/2068 ---              ASSESSMENT     29 y.o. female with     1. Attention deficit hyperactivity disorder (ADHD), unspecified ADHD type    2. Generalized anxiety disorder    3. Bipolar disorder, in full remission, most recent episode mixed    4. Need for influenza vaccination      Assessed anxiety and ADHD management, considering transition from Klonopin and methylphenidate to alternative treatments  Determined Vyvanse as more suitable ADHD medication for adult patients compared to methylphenidate  Evaluated current antidepressant (fluoxetine) and mood stabilizer (lamotrigine) regimen, noting effectiveness for anxiety and bipolar management  Considered impact of recent IUD removal on patient's hormonal balance  PLAN:     1. Attention deficit hyperactivity disorder (ADHD), unspecified ADHD type  Discussed differences between methylphenidate (Ritalin) and Vyvanse for adult ADHD management.  Explained potential effects of ADHD on various life aspects, including relationships and home life.  Started Vyvanse 20 mg daily for ADHD management.  Discontinued methylphenidate (Concerta).  Contact office via [employer] if experiencing side effects or issues with Vyvanse.  - lisdexamfetamine (VYVANSE) 20 MG capsule; Take 1 capsule (20 mg total) by mouth every morning.  Dispense: 30 capsule; Refill: 0    2. Generalized anxiety disorder  Explained drawbacks of long-term daily Klonopin use, including tolerance development and withdrawal effects.  Decreased Klonopin to 10 tablets per month (approximately 2-3 times per week) for occasional anxiety management.  Continued fluoxetine (Prozac) at current dose for anxiety management.  - clonazePAM (KLONOPIN) 0.5 MG tablet; Take 1 tablet (0.5 mg total) by mouth daily as needed (panic  attack).  Dispense: 30 tablet; Refill: 0    3. Bipolar disorder, in full remission, most recent episode mixed  Continued lamotrigine 300 mg daily for mood stabilization.  - lamotrigine XR (LAMICTAL XR) 300 mg XR tablet; Take 1 tablet (300 mg total) by mouth once daily.    4. Need for influenza vaccination  Flu shot administered in office.  - influenza (Flulaval, Fluzone, Fluarix) 45 mcg/0.5 mL IM vaccine (> or = 6 mo) 0.5 mL    FOLLOW-UP:  Follow up in 3 months (around February).  Reschedule appointment if needed due to work conflicts.       Colleen Ovalles MD  11/14/2024 12:54 PM      This note was generated with the assistance of ambient listening technology. Verbal consent was obtained by the patient and accompanying visitor(s) for the recording of patient appointment to facilitate this note. I attest to having reviewed and edited the generated note for accuracy, though some syntax or spelling errors may persist. Please contact the author of this note for any clarification.                 [facility]

## 2025-01-06 DIAGNOSIS — F90.9 ATTENTION DEFICIT HYPERACTIVITY DISORDER (ADHD), UNSPECIFIED ADHD TYPE: ICD-10-CM

## 2025-01-06 DIAGNOSIS — F41.1 GENERALIZED ANXIETY DISORDER: ICD-10-CM

## 2025-01-06 RX ORDER — LISDEXAMFETAMINE DIMESYLATE 20 MG/1
20 CAPSULE ORAL EVERY MORNING
Qty: 30 CAPSULE | Refills: 0 | Status: SHIPPED | OUTPATIENT
Start: 2025-01-06 | End: 2026-01-06

## 2025-01-06 RX ORDER — CLONAZEPAM 0.5 MG/1
0.5 TABLET ORAL DAILY PRN
Qty: 30 TABLET | Refills: 0 | OUTPATIENT
Start: 2025-01-06 | End: 2026-01-06

## 2025-01-25 DIAGNOSIS — F31.78 BIPOLAR DISORDER, IN FULL REMISSION, MOST RECENT EPISODE MIXED: ICD-10-CM

## 2025-01-27 RX ORDER — LAMOTRIGINE 200 MG/1
200 TABLET, EXTENDED RELEASE ORAL DAILY
Qty: 90 TABLET | Refills: 3 | Status: SHIPPED | OUTPATIENT
Start: 2025-01-27 | End: 2026-01-27

## 2025-02-12 DIAGNOSIS — F41.1 GENERALIZED ANXIETY DISORDER: ICD-10-CM

## 2025-02-12 RX ORDER — CLONAZEPAM 0.5 MG/1
0.5 TABLET ORAL DAILY PRN
Qty: 30 TABLET | Refills: 0 | Status: SHIPPED | OUTPATIENT
Start: 2025-02-12 | End: 2026-02-12

## 2025-02-14 ENCOUNTER — OFFICE VISIT (OUTPATIENT)
Facility: CLINIC | Age: 30
End: 2025-02-14
Payer: MEDICAID

## 2025-02-14 VITALS
WEIGHT: 250.75 LBS | HEIGHT: 62 IN | BODY MASS INDEX: 46.14 KG/M2 | HEART RATE: 91 BPM | SYSTOLIC BLOOD PRESSURE: 124 MMHG | OXYGEN SATURATION: 98 % | TEMPERATURE: 99 F | RESPIRATION RATE: 18 BRPM | DIASTOLIC BLOOD PRESSURE: 98 MMHG

## 2025-02-14 DIAGNOSIS — F41.1 GENERALIZED ANXIETY DISORDER: ICD-10-CM

## 2025-02-14 DIAGNOSIS — F90.9 ATTENTION DEFICIT HYPERACTIVITY DISORDER (ADHD), UNSPECIFIED ADHD TYPE: Primary | ICD-10-CM

## 2025-02-14 DIAGNOSIS — F31.78 BIPOLAR DISORDER, IN FULL REMISSION, MOST RECENT EPISODE MIXED: ICD-10-CM

## 2025-02-14 PROCEDURE — 99999 PR PBB SHADOW E&M-EST. PATIENT-LVL IV: CPT | Mod: PBBFAC,,, | Performed by: STUDENT IN AN ORGANIZED HEALTH CARE EDUCATION/TRAINING PROGRAM

## 2025-02-14 PROCEDURE — 99214 OFFICE O/P EST MOD 30 MIN: CPT | Mod: PBBFAC,PN | Performed by: STUDENT IN AN ORGANIZED HEALTH CARE EDUCATION/TRAINING PROGRAM

## 2025-02-14 RX ORDER — LAMOTRIGINE 300 MG/1
300 TABLET, EXTENDED RELEASE ORAL DAILY
Qty: 30 TABLET | Refills: 11 | Status: SHIPPED | OUTPATIENT
Start: 2025-02-14 | End: 2026-02-14

## 2025-02-14 RX ORDER — FLUOXETINE HYDROCHLORIDE 20 MG/1
CAPSULE ORAL
Status: CANCELLED | OUTPATIENT
Start: 2025-02-14

## 2025-02-14 RX ORDER — LAMOTRIGINE 200 MG/1
200 TABLET, EXTENDED RELEASE ORAL DAILY
Qty: 90 TABLET | Refills: 3 | Status: CANCELLED | OUTPATIENT
Start: 2025-02-14 | End: 2026-02-14

## 2025-02-14 RX ORDER — FLUOXETINE 10 MG/1
10 CAPSULE ORAL DAILY
Qty: 14 CAPSULE | Refills: 0 | Status: SHIPPED | OUTPATIENT
Start: 2025-02-14 | End: 2025-02-21

## 2025-02-14 NOTE — PROGRESS NOTES
"SUBJECTIVE     Chief Complaint   Patient presents with    ADHD     MEDICATION FOLLOW UP       History of Present Illness    CHIEF COMPLAINT:  Patient presents today for medication management.    ANXIETY AND DEPRESSION:  She reports anxiety symptoms including restlessness, jitteriness, irritability, difficulty sleeping, and racing thoughts. She experiences panic attacks when anxiety escalates. She notes that anxiety symptoms typically precede and lead to depressive episodes.    MEDICATIONS:  She reports side effects from Vyvanse including increased irritability and feeling on edge prison through the day. She has been taking Prozac for 1-2 years and uses Klonopin as needed, approximately 2-3 times per week.    SOCIAL HISTORY:  She reports a career change in the past year.       [id number] 13    PAST MEDICAL HISTORY:  Past Medical History:   Diagnosis Date    Anxiety     Bipolar disorder, unspecified     Depression     PTSD (post-traumatic stress disorder)     STD (sexually transmitted disease)        ALLERGIES AND MEDICATIONS: updated and reviewed.  Review of patient's allergies indicates:  No Known Allergies  Current Outpatient Medications   Medication Sig Dispense Refill    clonazePAM (KLONOPIN) 0.5 MG tablet Take 1 tablet (0.5 mg total) by mouth daily as needed (panic attack). 30 tablet 0    triamcinolone acetonide 0.1% (KENALOG) 0.1 % cream APPLY  CREAM EXTERNALLY TWICE DAILY 80 g 0    FLUoxetine 10 MG capsule Take 1 capsule (10 mg total) by mouth once daily. for 7 days 14 capsule 0    lamotrigine XR (LAMICTAL XR) 300 mg XR tablet Take 1 tablet (300 mg total) by mouth once daily. 30 tablet 11     No current facility-administered medications for this visit.         OBJECTIVE     Physical Exam  Vitals:    02/14/25 1055   BP: (!) 124/98   Pulse: 91   Resp: 18   Temp: 98.8 °F (37.1 °C)    Body mass index is 45.87 kg/m².  Weight: 113.7 kg (250 lb 12.4 oz)   Height: 5' 2" (157.5 cm)     Physical Exam  Vitals reviewed. "   Constitutional:       General: She is not in acute distress.  HENT:      Right Ear: External ear normal.      Left Ear: External ear normal.      Nose: Nose normal.      Mouth/Throat:      Mouth: Mucous membranes are moist.   Eyes:      Extraocular Movements: Extraocular movements intact.      Conjunctiva/sclera: Conjunctivae normal.      Pupils: Pupils are equal, round, and reactive to light.   Pulmonary:      Effort: Pulmonary effort is normal.   Abdominal:      General: There is no distension.      Palpations: Abdomen is soft.   Musculoskeletal:         General: No swelling. Normal range of motion.      Cervical back: Normal range of motion.   Skin:     General: Skin is warm and dry.      Findings: No rash.   Neurological:      General: No focal deficit present.      Mental Status: She is alert and oriented to person, place, and time.   Psychiatric:         Mood and Affect: Mood normal.         Behavior: Behavior normal.           Health Maintenance         Date Due Completion Date    Pneumococcal Vaccines (Age 0-49) (1 of 2 - PCV) Never done ---    COVID-19 Vaccine (1 - 2024-25 season) Never done ---    Cervical Cancer Screening 03/12/2029 3/12/2024    TETANUS VACCINE 07/17/2032 7/17/2022    RSV Vaccine (Age 60+ and Pregnant patients) (1 - 1-dose 75+ series) 11/02/2068 ---              ASSESSMENT     29 y.o. female with     1. Attention deficit hyperactivity disorder (ADHD), unspecified ADHD type    2. Bipolar disorder, in full remission, most recent episode mixed    3. Generalized anxiety disorder      - Assessed patient's response to Vyvanse; noted similar side effects as with Ritalin (irritability, feeling on edge)  - Evaluated ongoing anxiety symptoms despite Prozac use  - Determined Prozac may be contributing to patient's anxiety due to its activating properties  - Will discontinue Prozac and increase Lamictal to address mood and anxiety symptoms  - Considered patient's baseline personality and aim to  manage symptoms without altering core traits  - Assessed current Klonopin usage and determined it is being used appropriately as needed  - Plan to reassess need for ADHD medication after mood stabilization with Lamictal  PLAN:     1. Bipolar disorder, in full remission, most recent episode mixed  - Assessed the patient's mood, noting reports of being an ecstatic, go-go happy person by nature, indicating potential manic or hypomanic symptoms.  - Evaluated that the patient's mood was being artificially stimulated and depressed by medications.  - Determined that the patient primarily needs a mood stabilizer for bipolar disorder management.  - Increased Lamictal dosage to 300 mg for better mood stabilization.  - Educated the patient on the importance of mood stabilizers for bipolar disorder management.  - Scheduled a follow-up visit in 4 weeks to assess the effectiveness of the new treatment plan.  - lamotrigine XR (LAMICTAL XR) 300 mg XR tablet; Take 1 tablet (300 mg total) by mouth once daily.  Dispense: 30 tablet; Refill: 11  - FLUoxetine 10 MG capsule; Take 1 capsule (10 mg total) by mouth once daily. for 7 days  Dispense: 14 capsule; Refill: 0    2. Attention deficit hyperactivity disorder (ADHD), unspecified ADHD type  - Monitored patient's report of negative effects from Vyvanse, similar to previous experience with Ritalin, including increased irritability and feeling on edge.  - Acknowledged the need to address ADHD symptoms after stabilizing mood.  - Discontinued Vyvanse to focus on mood stabilization.  - lamotrigine XR (LAMICTAL XR) 300 mg XR tablet; Take 1 tablet (300 mg total) by mouth once daily.  Dispense: 30 tablet; Refill: 11  - FLUoxetine 10 MG capsule; Take 1 capsule (10 mg total) by mouth once daily. for 7 days  Dispense: 14 capsule; Refill: 0    3. Generalized anxiety disorder  - Monitored ongoing anxiety symptoms including restlessness, jitteriness, irritability, insomnia, and racing thoughts.  -  Noted that the patient's anxiety score remains high according to previous assessment.  - Assessed that Prozac may not be the optimal SSRI for anxiety management in this patient.  - Explained differences between activating and calming SSRIs.  - Discussed Prozac's potential role in exacerbating anxiety symptoms.  - Continued Klonopin as needed (current use: 2-3 times per week) for anxiety management.  - Planned to discontinue Prozac and increase Lamictal to address anxiety symptoms.    MEDICATIONS/SUPPLEMENTS:  - Monitored patient's report of adverse effects from Prozac, including increased activation and potential exacerbation of anxiety symptoms.  - Evaluated that Prozac may be contributing to the patient's anxiety and overactivation.  - Assessed that discontinuing Prozac may help alleviate some of the patient's symptoms.  - Prescribed 10 mg Prozac for 1 week, then discontinue, to taper off the medication.  - Instructed the patient to report any severe withdrawal symptoms during the tapering process.    OTHER INSTRUCTIONS:  - Explained the interaction between neurochemistry and environment in mental health.    FOLLOW UP:  - Follow up on March 12th at 11:20 AM for virtual appointment.  - Contact the office if unable to tolerate medication changes after 1 week.         Colleen Ovalles MD  02/14/2025 11:26 AM      This note was generated with the assistance of ambient listening technology. Verbal consent was obtained by the patient and accompanying visitor(s) for the recording of patient appointment to facilitate this note. I attest to having reviewed and edited the generated note for accuracy, though some syntax or spelling errors may persist. Please contact the author of this note for any clarification.

## 2025-03-12 ENCOUNTER — OFFICE VISIT (OUTPATIENT)
Facility: CLINIC | Age: 30
End: 2025-03-12

## 2025-03-12 ENCOUNTER — PATIENT MESSAGE (OUTPATIENT)
Dept: ADMINISTRATIVE | Facility: OTHER | Age: 30
End: 2025-03-12

## 2025-03-12 DIAGNOSIS — F31.78 BIPOLAR DISORDER, IN FULL REMISSION, MOST RECENT EPISODE MIXED: Primary | ICD-10-CM

## 2025-03-12 DIAGNOSIS — F90.9 ATTENTION DEFICIT HYPERACTIVITY DISORDER (ADHD), UNSPECIFIED ADHD TYPE: ICD-10-CM

## 2025-03-12 DIAGNOSIS — F41.1 GENERALIZED ANXIETY DISORDER: ICD-10-CM

## 2025-03-12 RX ORDER — BUPROPION HYDROCHLORIDE 150 MG/1
150 TABLET ORAL DAILY
Qty: 90 TABLET | Refills: 0 | Status: SHIPPED | OUTPATIENT
Start: 2025-03-12 | End: 2026-03-12

## 2025-03-12 RX ORDER — LAMOTRIGINE 300 MG/1
300 TABLET, EXTENDED RELEASE ORAL DAILY
Qty: 30 TABLET | Refills: 11 | Status: SHIPPED | OUTPATIENT
Start: 2025-03-12 | End: 2026-03-12

## 2025-03-12 NOTE — PROGRESS NOTES
The patient's location is:  Patient's Home   The chief complaint leading to consultation is:  Bipolar disorder, in full remission, most recent episode mixed [F31.78]    Visit type: audiovisual    Time spent in discussion with the patient: 8 minutes  21 minutes of total time spent on the encounter. This includes time spent in discussion with the patient and time preparing for the patient appointment: review of tests, obtaining and/or reviewing separately obtained history, documenting clinical information in the electronic or other health record, independently interpreting results (not separately reported), and communicating results to the patient/family/caregiver or care coordination (not separately reported).     Each patient to whom he or she provides medical services by telemedicine is: (1) informed of the relationship between the physician and patient and the respective role of any other health care provider with respect to management of the patient; and (2) notified that he or she may decline to receive medical services by telemedicine and may withdraw from such care at any time.  SUBJECTIVE     History of Present Illness    CHIEF COMPLAINT:  Patient presents today for follow up of depression symptoms after stopping Prozac.    DEPRESSION AND ANXIETY:  She reports experiencing anhedonia and lack of motivation, specifically struggling with maintaining a consistent wake time and walking routine since discontinuing her SSRI. While she denies experiencing severe depression as in the past, she notes a general lack of interest in daily activities. Her anxiety symptoms have improved, occurring approximately 3 times per week, which she describes as an improvement from previous levels.    MEDICATION HISTORY:  Her past SSRI medications include Prozac and Paxil. She currently takes Lamictal 300 mg.      ROS:  General: -fever, -chills, -fatigue, -weight gain, -weight loss  Eyes: -vision changes, -redness, -discharge  ENT:  -ear pain, -nasal congestion, -sore throat  Cardiovascular: -chest pain, -palpitations, -lower extremity edema  Respiratory: -cough, -shortness of breath  Gastrointestinal: -abdominal pain, -nausea, -vomiting, -diarrhea, -constipation, -blood in stool  Genitourinary: -dysuria, -hematuria, -frequency  Musculoskeletal: -joint pain, -muscle pain  Skin: -rash, -lesion  Neurological: -headache, -dizziness, -numbness, -tingling  Psychiatric: +anxiety, -depression, -sleep difficulty           PAST MEDICAL HISTORY:  Past Medical History:   Diagnosis Date    Anxiety     Bipolar disorder, unspecified     Depression     PTSD (post-traumatic stress disorder)     STD (sexually transmitted disease)        ALLERGIES AND MEDICATIONS: updated and reviewed.  Review of patient's allergies indicates:  No Known Allergies  Current Medications[1]      OBJECTIVE     Physical Exam  There were no vitals filed for this visit. There is no height or weight on file to calculate BMI.            Physical Exam  Vitals reviewed.   Constitutional:       General: She is not in acute distress.  HENT:      Right Ear: External ear normal.      Left Ear: External ear normal.      Nose: Nose normal.      Mouth/Throat:      Mouth: Mucous membranes are moist.   Eyes:      Extraocular Movements: Extraocular movements intact.      Conjunctiva/sclera: Conjunctivae normal.      Pupils: Pupils are equal, round, and reactive to light.   Pulmonary:      Effort: Pulmonary effort is normal.   Abdominal:      General: There is no distension.      Palpations: Abdomen is soft.   Musculoskeletal:         General: No swelling. Normal range of motion.      Cervical back: Normal range of motion.   Skin:     General: Skin is warm and dry.      Findings: No rash.   Neurological:      General: No focal deficit present.      Mental Status: She is alert and oriented to person, place, and time.   Psychiatric:         Mood and Affect: Mood normal.         Behavior: Behavior  normal.           Health Maintenance         Date Due Completion Date    Pneumococcal Vaccines (Age 0-49) (1 of 2 - PCV) Never done ---    COVID-19 Vaccine (1 - 2024-25 season) Never done ---    Cervical Cancer Screening 03/12/2029 3/12/2024    TETANUS VACCINE 07/17/2032 7/17/2022    RSV Vaccine (Age 60+ and Pregnant patients) (1 - 1-dose 75+ series) 11/02/2068 ---              ASSESSMENT     29 y.o. female with     1. Bipolar disorder, in full remission, most recent episode mixed    2. Attention deficit hyperactivity disorder (ADHD), unspecified ADHD type    3. Generalized anxiety disorder      - Assessed patient's depression symptoms since discontinuing Prozac, noting increased anhedonia and lack of motivation  - Chose Wellbutrin over restarting SSRI due to its efficacy for both depression and ADHD  - Maintained current Lamictal dosage as anxiety symptoms are well-controlled  PLAN:     1. Bipolar disorder, in full remission, most recent episode mixed  - Assessed the patient's condition, noting experiences of anhedonia and lack of motivation since discontinuing Prozac.  - Evaluated that the patient is experiencing more depression since stopping Prozac, but not as severe as previous episodes.  - Prescribed Wellbutrin  mg daily for depression and ADHD symptoms.  - Continued Lamictal 300 mg.  - Explained Wellbutrin's efficacy for depression and discussed potential side effects, particularly the risk of hypomania.  - Instructed the patient to contact the office in 1.5 weeks to report on Wellbutrin efficacy and side effects.  - Advised the patient to message if hypomania symptoms occur, and discontinue Wellbutrin immediately if experienced.  - Patient to continue efforts to establish a routine, including regular walks and maintaining a consistent wake-up time.  - lamotrigine XR (LAMICTAL XR) 300 mg XR tablet; Take 1 tablet (300 mg total) by mouth once daily.  Dispense: 30 tablet; Refill: 11    2. Attention deficit  hyperactivity disorder (ADHD), unspecified ADHD type  - uncontrolled, add wellbutrin.  - buPROPion (WELLBUTRIN XL) 150 MG TB24 tablet; Take 1 tablet (150 mg total) by mouth once daily.  Dispense: 90 tablet; Refill: 0  - lamotrigine XR (LAMICTAL XR) 300 mg XR tablet; Take 1 tablet (300 mg total) by mouth once daily.  Dispense: 30 tablet; Refill: 11    3. Generalized anxiety disorder  - Evaluated that the patient's anxiety is occurring approximately 3 times per week.  - Assessed that the patient's anxiety is currently well-managed and better with current treatment.  - Continued as-needed anxiety medication (taken approximately 3 times per week).  - Maintained current anxiety management, including Lamictal.  - buPROPion (WELLBUTRIN XL) 150 MG TB24 tablet; Take 1 tablet (150 mg total) by mouth once daily.  Dispense: 90 tablet; Refill: 0    FOLLOW-UP:  - Scheduled follow-up visit in 1 month.     Colleen Ovalles MD  03/12/2025 11:42 AM      This note was generated with the assistance of ambient listening technology. Verbal consent was obtained by the patient and accompanying visitor(s) for the recording of patient appointment to facilitate this note. I attest to having reviewed and edited the generated note for accuracy, though some syntax or spelling errors may persist. Please contact the author of this note for any clarification.                     [1]   Current Outpatient Medications   Medication Sig Dispense Refill    buPROPion (WELLBUTRIN XL) 150 MG TB24 tablet Take 1 tablet (150 mg total) by mouth once daily. 90 tablet 0    clonazePAM (KLONOPIN) 0.5 MG tablet Take 1 tablet (0.5 mg total) by mouth daily as needed (panic attack). 30 tablet 0    lamotrigine XR (LAMICTAL XR) 300 mg XR tablet Take 1 tablet (300 mg total) by mouth once daily. 30 tablet 11    triamcinolone acetonide 0.1% (KENALOG) 0.1 % cream APPLY  CREAM EXTERNALLY TWICE DAILY 80 g 0     No current facility-administered medications for this visit.

## 2025-04-07 ENCOUNTER — PATIENT MESSAGE (OUTPATIENT)
Facility: CLINIC | Age: 30
End: 2025-04-07

## 2025-04-07 DIAGNOSIS — F41.1 GENERALIZED ANXIETY DISORDER: ICD-10-CM

## 2025-04-07 DIAGNOSIS — F90.9 ATTENTION DEFICIT HYPERACTIVITY DISORDER (ADHD), UNSPECIFIED ADHD TYPE: ICD-10-CM

## 2025-04-08 RX ORDER — BUPROPION HYDROCHLORIDE 300 MG/1
300 TABLET ORAL DAILY
Qty: 90 TABLET | Refills: 0 | Status: SHIPPED | OUTPATIENT
Start: 2025-04-08 | End: 2026-04-08

## 2025-04-16 ENCOUNTER — PATIENT MESSAGE (OUTPATIENT)
Facility: CLINIC | Age: 30
End: 2025-04-16

## 2025-04-16 ENCOUNTER — OFFICE VISIT (OUTPATIENT)
Facility: CLINIC | Age: 30
End: 2025-04-16

## 2025-04-16 DIAGNOSIS — F31.78 BIPOLAR DISORDER, IN FULL REMISSION, MOST RECENT EPISODE MIXED: ICD-10-CM

## 2025-04-16 DIAGNOSIS — F41.1 GENERALIZED ANXIETY DISORDER: ICD-10-CM

## 2025-04-16 DIAGNOSIS — L70.0 ACNE VULGARIS: ICD-10-CM

## 2025-04-16 DIAGNOSIS — F90.9 ATTENTION DEFICIT HYPERACTIVITY DISORDER (ADHD), UNSPECIFIED ADHD TYPE: ICD-10-CM

## 2025-04-16 DIAGNOSIS — F90.9 ATTENTION DEFICIT HYPERACTIVITY DISORDER (ADHD), UNSPECIFIED ADHD TYPE: Primary | ICD-10-CM

## 2025-04-16 RX ORDER — FLUOXETINE 10 MG/1
10 CAPSULE ORAL DAILY
Qty: 90 CAPSULE | Refills: 3 | Status: SHIPPED | OUTPATIENT
Start: 2025-04-16 | End: 2026-04-16

## 2025-04-16 RX ORDER — BUPROPION HYDROCHLORIDE 150 MG/1
150 TABLET ORAL DAILY
Qty: 90 TABLET | Refills: 3 | Status: SHIPPED | OUTPATIENT
Start: 2025-04-16 | End: 2026-04-16

## 2025-04-16 NOTE — PATIENT INSTRUCTIONS
Gerard Isaacs MD - Dermatologist  [address] [place] [address]., [id number]  [place] [address]  [phone]

## 2025-04-16 NOTE — PROGRESS NOTES
The patient's location is:  Patient's Home   The chief complaint leading to consultation is:  Attention deficit hyperactivity disorder (ADHD), unspecified ADHD type [F90.9]    Visit type: audiovisual    Time spent in discussion with the patient: 9 minutes  22 minutes of total time spent on the encounter. This includes time spent in discussion with the patient and time preparing for the patient appointment: review of tests, obtaining and/or reviewing separately obtained history, documenting clinical information in the electronic or other health record, independently interpreting results (not separately reported), and communicating results to the patient/family/caregiver or care coordination (not separately reported).     Each patient to whom he or she provides medical services by telemedicine is: (1) informed of the relationship between the physician and patient and the respective role of any other health care provider with respect to management of the patient; and (2) notified that he or she may decline to receive medical services by telemedicine and may withdraw from such care at any time.  SUBJECTIVE       History of Present Illness    CHIEF COMPLAINT:  Patient presents today for medication management and follow-up    DEPRESSION AND ANXIETY:  She reports waking up feeling unmotivated and flat on a daily basis, but denies recent dark thoughts. She previously had good results with Prozac 30mg despite some residual anxiety. Recently tried Wellbutrin 300mg for 5 days but experienced overstimulation and discomfort with driving. She notes Wellbutrin 150mg was beneficial for anxiety management and improved methodical thinking. She expresses interest in returning to a previously effective medication regimen.      ROS:  General: -fever, -chills, -fatigue, -weight gain, -weight loss  Eyes: -vision changes, -redness, -discharge  ENT: -ear pain, -nasal congestion, -sore throat  Cardiovascular: -chest pain, -palpitations,  -lower extremity edema  Respiratory: -cough, -shortness of breath  Gastrointestinal: -abdominal pain, -nausea, -vomiting, -diarrhea, -constipation, -blood in stool  Genitourinary: -dysuria, -hematuria, -frequency  Musculoskeletal: -joint pain, -muscle pain  Skin: -rash, -lesion  Neurological: -headache, -dizziness, -numbness, -tingling  Psychiatric: +anxiety, +depression, -sleep difficulty           PAST MEDICAL HISTORY:  Past Medical History:   Diagnosis Date    Anxiety     Bipolar disorder, unspecified     Depression     PTSD (post-traumatic stress disorder)     STD (sexually transmitted disease)        ALLERGIES AND MEDICATIONS: updated and reviewed.  Review of patient's allergies indicates:  No Known Allergies  Current Medications[1]      OBJECTIVE     Physical Exam  There were no vitals filed for this visit. There is no height or weight on file to calculate BMI.            Physical Exam  Vitals reviewed.   Constitutional:       General: She is not in acute distress.  HENT:      Right Ear: External ear normal.      Left Ear: External ear normal.      Nose: Nose normal.      Mouth/Throat:      Mouth: Mucous membranes are moist.   Eyes:      Extraocular Movements: Extraocular movements intact.      Conjunctiva/sclera: Conjunctivae normal.      Pupils: Pupils are equal, round, and reactive to light.   Pulmonary:      Effort: Pulmonary effort is normal.   Abdominal:      General: There is no distension.      Palpations: Abdomen is soft.   Musculoskeletal:         General: No swelling. Normal range of motion.      Cervical back: Normal range of motion.   Skin:     General: Skin is warm and dry.      Findings: No rash.   Neurological:      General: No focal deficit present.      Mental Status: She is alert and oriented to person, place, and time.   Psychiatric:         Mood and Affect: Mood normal.         Behavior: Behavior normal.           Health Maintenance         Date Due Completion Date    Pneumococcal Vaccines  (Age 0-49) (1 of 2 - PCV) Never done ---    COVID-19 Vaccine (1 - 2024-25 season) Never done ---    Cervical Cancer Screening 03/12/2029 3/12/2024    TETANUS VACCINE 07/17/2032 7/17/2022    RSV Vaccine (Age 60+ and Pregnant patients) (1 - 1-dose 75+ series) 11/02/2068 ---              ASSESSMENT     29 y.o. female with     1. Attention deficit hyperactivity disorder (ADHD), unspecified ADHD type    2. Generalized anxiety disorder    3. Bipolar disorder, in full remission, most recent episode mixed    4. Acne vulgaris      F32.9 Major depressive disorder, single episode, unspecified  F41.9 Anxiety disorder, unspecified    IMPRESSION:  Assessed response to Wellbutrin 300 mg, noting overstimulation and discomfort.  Decreased Wellbutrin to 150 mg daily due to better tolerability.  Combined Wellbutrin 150 mg with low-dose Prozac to address both anxiety and depression.  PLAN:     1. Attention deficit hyperactivity disorder (ADHD), unspecified ADHD type  - Stable, continue current regimen.   - buPROPion (WELLBUTRIN XL) 150 MG TB24 tablet; Take 1 tablet (150 mg total) by mouth once daily.  Dispense: 90 tablet; Refill: 3  - FLUoxetine 10 MG capsule; Take 1 capsule (10 mg total) by mouth once daily.  Dispense: 90 capsule; Refill: 3    2. Generalized anxiety disorder  - Patient reports feeling overstimulated and uncomfortable driving while on a higher dose of Wellbutrin.  - Evaluated that the lower dose of Wellbutrin (150 mg) was effective for anxiety management.  - Discussed medication options with the patient to address both depression and anxiety symptoms.  - Prescribed a combination of Wellbutrin 150 mg and Prozac 10 mg to address both anxiety and depression.  - buPROPion (WELLBUTRIN XL) 150 MG TB24 tablet; Take 1 tablet (150 mg total) by mouth once daily.  Dispense: 90 tablet; Refill: 3  - FLUoxetine 10 MG capsule; Take 1 capsule (10 mg total) by mouth once daily.  Dispense: 90 capsule; Refill: 3    3. Bipolar disorder,  in full remission, most recent episode mixed  - Prescribed Prozac 10 mg daily, with the option to increase to 20 mg daily after a few weeks if needed.  - Patient reports feeling unmotivated and 'blah' in the mornings, with a flat baseline mood.  - Noted that the patient denies having dark thoughts recently.  - Evaluated that the previous medication (Wellbutrin 150 mg) was not effectively addressing depression symptoms.  - Discussed medication options with the patient, including returning to Prozac or trying a new medication (Effexor/Venlafaxine).  - Prescribed a combination of Wellbutrin 150 mg and Prozac 10 mg.  - buPROPion (WELLBUTRIN XL) 150 MG TB24 tablet; Take 1 tablet (150 mg total) by mouth once daily.  Dispense: 90 tablet; Refill: 3  - FLUoxetine 10 MG capsule; Take 1 capsule (10 mg total) by mouth once daily.  Dispense: 90 capsule; Refill: 3    4. Acne vulgaris  - worsening, refer to derm.  - Ambulatory referral/consult to Dermatology; Future      FOLLOW-UP:  - Scheduled a follow-up virtual appointment in 2 months.  - Instructed the patient to contact the office regarding medication efficacy or desire to increase dosage before the next appointment.       Colleen Ovalles MD  04/16/2025 11:42 AM    This note was generated with the assistance of ambient listening technology. Verbal consent was obtained by the patient and accompanying visitor(s) for the recording of patient appointment to facilitate this note. I attest to having reviewed and edited the generated note for accuracy, though some syntax or spelling errors may persist. Please contact the author of this note for any clarification.                     [1]   Current Outpatient Medications   Medication Sig Dispense Refill    buPROPion (WELLBUTRIN XL) 150 MG TB24 tablet Take 1 tablet (150 mg total) by mouth once daily. 90 tablet 3    clonazePAM (KLONOPIN) 0.5 MG tablet Take 1 tablet (0.5 mg total) by mouth daily as needed (panic attack). 30 tablet 0     FLUoxetine 10 MG capsule Take 1 capsule (10 mg total) by mouth once daily. 90 capsule 3    lamotrigine XR (LAMICTAL XR) 300 mg XR tablet Take 1 tablet (300 mg total) by mouth once daily. 30 tablet 11    triamcinolone acetonide 0.1% (KENALOG) 0.1 % cream APPLY  CREAM EXTERNALLY TWICE DAILY 80 g 0     No current facility-administered medications for this visit.

## 2025-04-22 RX ORDER — FLUOXETINE HYDROCHLORIDE 20 MG/1
20 CAPSULE ORAL DAILY
Qty: 90 CAPSULE | Refills: 3 | Status: SHIPPED | OUTPATIENT
Start: 2025-04-22 | End: 2026-04-22

## 2025-04-22 RX ORDER — BUPROPION HYDROCHLORIDE 150 MG/1
150 TABLET ORAL DAILY
Qty: 90 TABLET | Refills: 3 | Status: SHIPPED | OUTPATIENT
Start: 2025-04-22 | End: 2026-04-22

## 2025-05-15 DIAGNOSIS — F41.1 GENERALIZED ANXIETY DISORDER: ICD-10-CM

## 2025-05-15 RX ORDER — CLONAZEPAM 0.5 MG/1
0.5 TABLET ORAL DAILY PRN
Qty: 30 TABLET | Refills: 0 | Status: SHIPPED | OUTPATIENT
Start: 2025-05-15 | End: 2026-05-15

## 2025-05-22 ENCOUNTER — PATIENT MESSAGE (OUTPATIENT)
Facility: CLINIC | Age: 30
End: 2025-05-22

## 2025-05-22 DIAGNOSIS — L24.4 IRRITANT CONTACT DERMATITIS DUE TO DRUG IN CONTACT WITH SKIN: Primary | ICD-10-CM

## 2025-06-02 ENCOUNTER — OFFICE VISIT (OUTPATIENT)
Facility: CLINIC | Age: 30
End: 2025-06-02

## 2025-06-02 DIAGNOSIS — F90.9 ATTENTION DEFICIT HYPERACTIVITY DISORDER (ADHD), UNSPECIFIED ADHD TYPE: ICD-10-CM

## 2025-06-02 DIAGNOSIS — L24.3 IRRITANT CONTACT DERMATITIS DUE TO COSMETICS: Primary | ICD-10-CM

## 2025-06-02 DIAGNOSIS — F31.78 BIPOLAR DISORDER, IN FULL REMISSION, MOST RECENT EPISODE MIXED: ICD-10-CM

## 2025-06-02 RX ORDER — ATOMOXETINE 40 MG/1
40 CAPSULE ORAL DAILY
Qty: 30 CAPSULE | Refills: 2 | Status: SHIPPED | OUTPATIENT
Start: 2025-06-02 | End: 2025-08-31

## 2025-06-02 RX ORDER — BETAMETHASONE VALERATE 1.2 MG/G
OINTMENT TOPICAL 2 TIMES DAILY
Qty: 45 G | Refills: 0 | Status: SHIPPED | OUTPATIENT
Start: 2025-06-02 | End: 2025-06-16

## 2025-06-02 RX ORDER — LAMOTRIGINE 200 MG/1
200 TABLET, EXTENDED RELEASE ORAL DAILY
Qty: 30 TABLET | Refills: 11 | Status: SHIPPED | OUTPATIENT
Start: 2025-06-02 | End: 2026-06-02

## 2025-08-06 DIAGNOSIS — F41.1 GENERALIZED ANXIETY DISORDER: ICD-10-CM

## 2025-08-06 RX ORDER — CLONAZEPAM 0.5 MG/1
TABLET ORAL
Qty: 30 TABLET | Refills: 0 | Status: SHIPPED | OUTPATIENT
Start: 2025-08-06

## (undated) DEVICE — SET BASIN 48X48IN 6000ML RING

## (undated) DEVICE — SCRUB 10% POVIDONE IODINE 4OZ

## (undated) DEVICE — Device

## (undated) DEVICE — TOWELS STERILE 18 X 25.5

## (undated) DEVICE — SEE L#120831

## (undated) DEVICE — SEAL LENS SCOPE MYOSURE

## (undated) DEVICE — SOL POVIDONE SCRUB IODINE 4 OZ

## (undated) DEVICE — PANTIES FEMININE NAPKIN LG/XLG

## (undated) DEVICE — TRAY DRY SKIN SCRUB PREP

## (undated) DEVICE — GLOVE BIOGEL SKINSENSE PI 6.5

## (undated) DEVICE — PACK FLUENT DISPOSABLE

## (undated) DEVICE — SOL IRR NACL .9% 3000ML